# Patient Record
Sex: FEMALE | Race: WHITE | Employment: UNEMPLOYED | URBAN - METROPOLITAN AREA
[De-identification: names, ages, dates, MRNs, and addresses within clinical notes are randomized per-mention and may not be internally consistent; named-entity substitution may affect disease eponyms.]

---

## 2017-04-17 ENCOUNTER — OFFICE VISIT (OUTPATIENT)
Dept: OPHTHALMOLOGY | Facility: CLINIC | Age: 37
End: 2017-04-17

## 2017-04-17 DIAGNOSIS — H57.89 THYROID EYE DISEASE: Primary | ICD-10-CM

## 2017-04-17 DIAGNOSIS — E07.9 THYROID EYE DISEASE: Primary | ICD-10-CM

## 2017-04-17 DIAGNOSIS — H04.123 DRY EYE SYNDROME, BILATERAL: ICD-10-CM

## 2017-04-17 DIAGNOSIS — H02.203 LAGOPHTHALMOS, RIGHT: ICD-10-CM

## 2017-04-17 RX ORDER — CYCLOSPORINE 0.5 MG/ML
1 EMULSION OPHTHALMIC 2 TIMES DAILY
Qty: 1 BOX | Refills: 11 | Status: SHIPPED | OUTPATIENT
Start: 2017-04-17

## 2017-04-17 ASSESSMENT — VISUAL ACUITY
OS_SC: 20/20
OD_SC: 20/20
METHOD: SNELLEN - LINEAR

## 2017-04-17 ASSESSMENT — LAGOPHTHALMOS
OD_LAGOPHTHALMOS: 1
OS_LAGOPHTHALMOS: 2

## 2017-04-17 ASSESSMENT — TONOMETRY
OD_IOP_MMHG: 16
IOP_METHOD: ICARE
OS_IOP_MMHG: 18

## 2017-04-17 ASSESSMENT — CONF VISUAL FIELD
METHOD: COUNTING FINGERS
OS_NORMAL: 1
OD_NORMAL: 1

## 2017-04-17 ASSESSMENT — MARGIN REFLEX DISTANCE
OD_MRD1: 5
OS_MRD1: 5

## 2017-04-17 NOTE — NURSING NOTE
Chief Complaints and History of Present Illnesses   Patient presents with     Follow Up For     thyroid eye disease     HPI    Affected eye(s):  Both   Symptoms:     No blurred vision   Tearing      Frequency:  Constant       Do you have eye pain now?:  No      Comments:  Pt states still notices some tearing but it may be related to allergies. Allergy testing soon. Night vision has seemed to decrease, Pt states eyes are opening at night again. So eyes are really dry.    Deborah ROSARIO 10:15 AM April 17, 2017

## 2017-04-17 NOTE — MR AVS SNAPSHOT
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      Who to contact     Please call your clinic at [phone] to:    Ask questions about your health    Make or cancel appointments    Discuss your medicines    Learn about your test results    Speak to your doctor   If you have compliments or concerns about an experience at your clinic, or if you wish to file a complaint, please contact [facility] Patient Relations at [phone] or email us at [email or web address]         Additional Information About Your Visit        MyChart Information     Modbook gives you secure access to your electronic health record. If you see a primary care provider, you can also send messages to your care team and make appointments. If you have questions, please call your primary care clinic.  If you do not have a primary care provider, please call [phone] and they will assist you.      Modbook is an electronic gateway that provides easy, online access to your medical records. With [employer], you can request a clinic appointment, read your test results, renew a prescription or communicate with your care team.     To access your existing account, please contact your [facility] or call [phone] for assistance.        Care EveryWhere ID     This is your Care EveryWhere ID. This could be used by other organizations to access your [place]  medical records  [id number]         Blood Pressure from Last 3 Encounters:   02/15/16 113/66   02/10/16 95/83   04/03/15 128/68    Weight from Last 3 Encounters:   02/10/16 79.2 kg (174 lb 9.7 oz)   04/03/15 68 kg (150 lb)   03/06/15 70.7 kg (155 lb 13.8 oz)              Today, you had the following     No orders found for display         Today's Medication Changes          These changes are accurate as of: 4/17/17 10:51 AM.  If you have any questions, ask your nurse or doctor.               Start taking these medicines.        Dose/Directions    cycloSPORINE 0.05 % ophthalmic emulsion   Commonly known as:  RESTASIS   Used for:  Dry eye syndrome, bilateral, Lagophthalmos, right   Started by:  Momo Wallace MD        Dose:  1 drop   Place 1 drop into both eyes 2 times daily   Quantity:  1 Box   Refills:  11            Where to get your medicines      These medications were sent to [facility] [address] [place] [address] [address]  [address] [facility] [address]     Phone:  [phone]     cycloSPORINE 0.05 % ophthalmic emulsion                Primary Care Provider Office Phone # Fax #    Travis Gooden [phone]       [place] [facility] [address] [facility] [address]        Thank you!     Thank you for choosing [facility] OPHTHALMOLOGY  for your care. Our goal is always to provide you with excellent care. Hearing back from our patients is one way we can continue to improve our services. Please take a few minutes to complete the written survey that you may receive in the mail after your visit with us. Thank you!             Your Updated Medication List - Protect others around you: Learn how to safely use, store and throw away your medicines at www.disposemymeds.org.          This list is accurate as of: 4/17/17 10:51 AM.  Always use your most recent med list.                   Brand Name Dispense Instructions for use    * REFRESH P.M. Oint      Apply to eye At Bedtime Both  eyes.       * Artificial Tear Ointment Oint     2 Tube    Apply 1 Application to eye 7 times daily       BIOTIN PO      Take by mouth daily as needed       carboxymethylcellulose 0.5 % Soln ophthalmic solution    REFRESH PLUS     1 drop as needed       cycloSPORINE 0.05 % ophthalmic emulsion    RESTASIS    1 Box    Place 1 drop into both eyes 2 times daily       * erythromycin ophthalmic ointment    ROMYCIN    3.5 g    Apply small amount to sutures three times daily, apply to inner lower lid of operative eye(s) at bedtime until further directed       * erythromycin ophthalmic ointment    ROMYCIN    1 Tube    Place 0.5 inches Into the left eye 4 times daily       HYDROcodone-acetaminophen 5-325 MG per tablet    NORCO    20 tablet    Take 1 tablet by mouth every 6 hours as needed for pain Maximum of 4000 mg of acetaminophen in 24 hours.       IBUPROFEN PO      Take 200 mg by mouth every 6 hours as needed for moderate pain       LORAZEPAM PO      Take 0.5 mg by mouth every 8 hours as needed       ondansetron 4 MG ODT tab    ZOFRAN-ODT     Take by mouth every 8 hours as needed       prednisoLONE acetate 1 % ophthalmic susp    PRED FORTE    1 Bottle    Place 1 drop into both eyes 2 times daily       PRILOSEC PO      Take 20 mg by mouth every morning       TIROSINT PO      Take 125 mcg by mouth daily       UNABLE TO FIND      Inject into the muscle every 3 months MEDICATION NAME: Depo Prevara every 3 months IM hip       * Notice:  This list has 4 medication(s) that are the same as other medications prescribed for you. Read the directions carefully, and ask your doctor or other care provider to review them with you.

## 2017-04-17 NOTE — PROGRESS NOTES
Chief Complaints and History of Present Illnesses   Patient presents with     Follow Up For     thyroid eye disease           She presents for follow up today.  She has hx of thyroid eye disease.  She had some plugs placed at the last visit but did not really notice a difference.  Still having tearing intermittently and her eyes open with sleeping.  She notices that they are still dry even though she is using ointment in her eyes.      Hx of decompression Medial with mary and lateral with Rodrigo: 1/2015  Bilateral lower eyelid retraction repair    Still smoking but cutting back      Assessment & Plan     Jefe Herrera is a 36 year old female with the following diagnoses:   1. Thyroid eye disease - Both Eyes    2. 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 Dry eye syndrome, bilateral - Both Eyes       Mostly dry eye complaints.  Did not really notice an improvement with plugs and they have since fallen out.  Currently using tears and ointment at night.      Plan:  - Start Restasis BID  - continue tears during the day  - continue ointment at night  - Glad press n seal  - If not improved than consider scleral lens evaluation    Return to clinic  2 mon         Attending Physician Attestation:  I have seen and examined this patient.  I have confirmed and edited as necessary the chief complaint(s), history of present illness, review of systems, relevant history, and examination findings as documented by others.  I have personally reviewed the relevant tests, images, and reports as documented above.  I have confirmed and edited as necessary the assessment and plan and agree with this note.    - Momo Wallace MD 10:25 AM 4/17/2017

## 2017-06-12 ENCOUNTER — OFFICE VISIT (OUTPATIENT)
Dept: OPHTHALMOLOGY | Facility: CLINIC | Age: 37
End: 2017-06-12

## 2017-06-12 DIAGNOSIS — H57.89 THYROID EYE DISEASE: ICD-10-CM

## 2017-06-12 DIAGNOSIS — E07.9 THYROID EYE DISEASE: ICD-10-CM

## 2017-06-12 DIAGNOSIS — H02.229 MECHANICAL LAGOPHTHALMOS, UNSPECIFIED LATERALITY: Primary | ICD-10-CM

## 2017-06-12 DIAGNOSIS — H02.539 EYELID RETRACTION OR LAG: ICD-10-CM

## 2017-06-12 ASSESSMENT — LAGOPHTHALMOS
OS_LAGOPHTHALMOS: 2
OD_LAGOPHTHALMOS: 1

## 2017-06-12 ASSESSMENT — CONF VISUAL FIELD
METHOD: COUNTING FINGERS
OD_NORMAL: 1
OS_NORMAL: 1

## 2017-06-12 ASSESSMENT — MARGIN REFLEX DISTANCE
OS_MRD1: 5
OD_MRD1: 5

## 2017-06-12 ASSESSMENT — TONOMETRY
OS_IOP_MMHG: 18
OD_IOP_MMHG: 16
IOP_METHOD: ICARE

## 2017-06-12 ASSESSMENT — VISUAL ACUITY
METHOD: SNELLEN - LINEAR
OS_SC+: -1
OS_SC: 20/20
OD_SC: 20/20

## 2017-06-12 NOTE — PROGRESS NOTES
Chief Complaints and History of Present Illnesses   Patient presents with     Follow Up For     Thyroid eye disease - Both Eyes   Still with pain in the am.   Eyes don't close at night.            Assessment & Plan     Jefe Herrera is a 36 year old female with the following diagnoses:   1. Mechanical lagophthalmos, unspecified laterality    2. Thyroid eye disease - Both Eyes    3. Eyelid retraction or lag         PLAN:  Bilateral lateral canthoplasty (canthopexy +tarso)         Attending Physician Attestation:  I have seen and examined this patient.  I have confirmed and edited as necessary the chief complaint(s), history of present illness, review of systems, relevant history, and examination findings as documented by others.  I have personally reviewed the relevant tests, images, and reports as documented above.  I have confirmed and edited as necessary the assessment and plan and agree with this note.    - Momo Wallace MD 9:57 AM 6/12/2017    Today with Jefe Herrera, I reviewed the indications, risks, benefits, and alternatives of the proposed surgical procedure including, but not limited to, failure obtain the desired result  and need for additional surgery, bleeding, infection, loss of vision, loss of the eye, and the remote possibility of permanent damage to any organ system or death with the use of anesthesia.  I provided multiple opportunities for the questions, answered all questions to the best of my ability, and confirmed that my answers and my discussion were understood.   - Momo Wallace MD 9:58 AM 6/12/2017

## 2017-06-12 NOTE — NURSING NOTE
Chief Complaints and History of Present Illnesses   Patient presents with     Follow Up For     Thyroid eye disease - Both Eyes     HPI    Affected eye(s):  Both   Symptoms:     No blurred vision      Frequency:  Constant       Do you have eye pain now?:  No      Comments:  2 month rtn for Thyroid eye disease - Both Eyes.  Pt states improvement with restasis. Stopped for a little bit because of stye. Pt states tried press and seal and eyes still opened up at night- would like a solution. Does not want to go back to using regular tape.    - Start Restasis BID  - continue tears during the day  - continue ointment at night  - Glad press n seal    Deborah Clay COT 9:35 AM June 12, 2017

## 2017-06-12 NOTE — MR AVS SNAPSHOT
After Visit Summary   6/12/2017    Jefe Herrera    MRN: [id number]           Patient Information     Date Of Birth          1980        Visit Information        Provider Department      6/12/2017 9:00 AM Momo Wallace MD [facility] Ophthalmology        Today's Diagnoses     Mechanical lagophthalmos, unspecified laterality    -  1    Thyroid eye disease - Both Eyes        Eyelid retraction or lag           Follow-ups after your visit        Your next 10 appointments already scheduled     Aug 21, 2017 10:30 AM CDT   (Arrive by 10:15 AM)   Post-Op with Momo Wallace MD   [facility]hthal[facility] ([facility] [place])    [address] [place] [id number] [phone]              Who to contact     Please call your clinic at [phone] to:    Ask questions about your health    Make or cancel appointments    Discuss your medicines    Learn about your test results    Speak to your doctor   If you have compliments or concerns about an experience at your clinic, or if you wish to file a complaint, please contact [facility] Patient Relations at [phone] or email us at [email or web address]         Additional Information About Your Visit        MyChart Information     [employer] gives you secure access to your electronic health record. If you see a primary care provider, you can also send messages to your care team and make appointments. If you have questions, please call your primary care clinic.  If you do not have a primary care provider, please call [phone] and they will assist you.      [employer] is an electronic gateway that provides easy, online access to your medical records. With [employer], you can request a clinic appointment, read your test results, renew a prescription or communicate with your care team.     To access your existing account, please contact your [facility] or  call [phone] for assistance.        Care EveryWhere ID     This is your Care EveryWhere ID. This could be used by other organizations to access your [place] medical records  [id number]         Blood Pressure from Last 3 Encounters:   02/15/16 113/66   02/10/16 95/83   04/03/15 128/68    Weight from Last 3 Encounters:   02/10/16 79.2 kg (174 lb 9.7 oz)   04/03/15 68 kg (150 lb)   03/06/15 70.7 kg (155 lb 13.8 oz)              We Performed the Following     Alee-Operative Worksheet (Plastics)        Primary Care Provider Office Phone # Fax #    Travis Gooden [phone]       [place] [facility] [address] [facility] [address]        Thank you!     Thank you for choosing [facility] OPHTHALMOLOGY  for your care. Our goal is always to provide you with excellent care. Hearing back from our patients is one way we can continue to improve our services. Please take a few minutes to complete the written survey that you may receive in the mail after your visit with us. Thank you!             Your Updated Medication List - Protect others around you: Learn how to safely use, store and throw away your medicines at www.disposemymeds.org.          This list is accurate as of: 6/12/17 10:14 AM.  Always use your most recent med list.                   Brand Name Dispense Instructions for use    * REFRESH P.M. Oint      Apply to eye At Bedtime Both eyes.       * Artificial Tear Ointment Oint     2 Tube    Apply 1 Application to eye 7 times daily       BIOTIN PO      Take by mouth daily as needed       carboxymethylcellulose 0.5 % Soln ophthalmic solution    REFRESH PLUS     1 drop as needed       cycloSPORINE 0.05 % ophthalmic emulsion    RESTASIS    1 Box    Place 1 drop into both eyes 2 times daily       * erythromycin ophthalmic ointment    ROMYCIN    3.5 g    Apply small amount to sutures three times daily, apply to inner lower lid of operative eye(s) at bedtime until further directed       * erythromycin  ophthalmic ointment    ROMYCIN    1 Tube    Place 0.5 inches Into the left eye 4 times daily       HYDROcodone-acetaminophen 5-325 MG per tablet    NORCO    20 tablet    Take 1 tablet by mouth every 6 hours as needed for pain Maximum of 4000 mg of acetaminophen in 24 hours.       IBUPROFEN PO      Take 200 mg by mouth every 6 hours as needed for moderate pain       LORAZEPAM PO      Take 0.5 mg by mouth every 8 hours as needed       ondansetron 4 MG ODT tab    ZOFRAN-ODT     Take by mouth every 8 hours as needed       prednisoLONE acetate 1 % ophthalmic susp    PRED FORTE    1 Bottle    Place 1 drop into both eyes 2 times daily       PRILOSEC PO      Take 20 mg by mouth every morning       TIROSINT PO      Take 125 mcg by mouth daily       UNABLE TO FIND      Inject into the muscle every 3 months MEDICATION NAME: Depo Prevara every 3 months IM hip       * Notice:  This list has 4 medication(s) that are the same as other medications prescribed for you. Read the directions carefully, and ask your doctor or other care provider to review them with you.

## 2017-08-07 ENCOUNTER — TELEPHONE (OUTPATIENT)
Dept: OPHTHALMOLOGY | Facility: CLINIC | Age: 37
End: 2017-08-07

## 2017-08-07 NOTE — TELEPHONE ENCOUNTER
I called patient to let her know that a nurse will call her one to two days prior to surgery to let her know what time her surgery will be at and also eating and drinking instructions.    Rex  Surgery Scheduler  [phone]

## 2017-08-08 ENCOUNTER — ANESTHESIA EVENT (OUTPATIENT)
Dept: SURGERY | Facility: AMBULATORY SURGERY CENTER | Age: 37
End: 2017-08-08

## 2017-08-09 ENCOUNTER — SURGERY (OUTPATIENT)
Age: 37
End: 2017-08-09

## 2017-08-09 ENCOUNTER — ANESTHESIA (OUTPATIENT)
Dept: SURGERY | Facility: AMBULATORY SURGERY CENTER | Age: 37
End: 2017-08-09

## 2017-08-09 ENCOUNTER — HOSPITAL ENCOUNTER (OUTPATIENT)
Facility: AMBULATORY SURGERY CENTER | Age: 37
End: 2017-08-09
Attending: OPHTHALMOLOGY

## 2017-08-09 VITALS
OXYGEN SATURATION: 97 % | WEIGHT: 174 LBS | RESPIRATION RATE: 16 BRPM | TEMPERATURE: 97.5 F | HEIGHT: 66 IN | SYSTOLIC BLOOD PRESSURE: 126 MMHG | BODY MASS INDEX: 27.97 KG/M2 | DIASTOLIC BLOOD PRESSURE: 76 MMHG

## 2017-08-09 DIAGNOSIS — Z98.890 POSTOPERATIVE EYE STATE: Primary | ICD-10-CM

## 2017-08-09 LAB
HCG UR QL: NEGATIVE
INTERNAL QC OK POCT: YES

## 2017-08-09 RX ORDER — KETOROLAC TROMETHAMINE 30 MG/ML
30 INJECTION, SOLUTION INTRAMUSCULAR; INTRAVENOUS EVERY 6 HOURS PRN
Status: DISCONTINUED | OUTPATIENT
Start: 2017-08-09 | End: 2017-08-10 | Stop reason: HOSPADM

## 2017-08-09 RX ORDER — ERYTHROMYCIN 5 MG/G
OINTMENT OPHTHALMIC PRN
Status: DISCONTINUED | OUTPATIENT
Start: 2017-08-09 | End: 2017-08-09 | Stop reason: HOSPADM

## 2017-08-09 RX ORDER — FENTANYL CITRATE 50 UG/ML
25-50 INJECTION, SOLUTION INTRAMUSCULAR; INTRAVENOUS
Status: DISCONTINUED | OUTPATIENT
Start: 2017-08-09 | End: 2017-08-09 | Stop reason: HOSPADM

## 2017-08-09 RX ORDER — MEPERIDINE HYDROCHLORIDE 25 MG/ML
12.5 INJECTION INTRAMUSCULAR; INTRAVENOUS; SUBCUTANEOUS
Status: DISCONTINUED | OUTPATIENT
Start: 2017-08-09 | End: 2017-08-10 | Stop reason: HOSPADM

## 2017-08-09 RX ORDER — ONDANSETRON 2 MG/ML
INJECTION INTRAMUSCULAR; INTRAVENOUS PRN
Status: DISCONTINUED | OUTPATIENT
Start: 2017-08-09 | End: 2017-08-09

## 2017-08-09 RX ORDER — ONDANSETRON 2 MG/ML
4 INJECTION INTRAMUSCULAR; INTRAVENOUS EVERY 30 MIN PRN
Status: DISCONTINUED | OUTPATIENT
Start: 2017-08-09 | End: 2017-08-10 | Stop reason: HOSPADM

## 2017-08-09 RX ORDER — NALOXONE HYDROCHLORIDE 0.4 MG/ML
.1-.4 INJECTION, SOLUTION INTRAMUSCULAR; INTRAVENOUS; SUBCUTANEOUS
Status: DISCONTINUED | OUTPATIENT
Start: 2017-08-09 | End: 2017-08-10 | Stop reason: HOSPADM

## 2017-08-09 RX ORDER — ONDANSETRON 4 MG/1
4 TABLET, ORALLY DISINTEGRATING ORAL EVERY 30 MIN PRN
Status: DISCONTINUED | OUTPATIENT
Start: 2017-08-09 | End: 2017-08-10 | Stop reason: HOSPADM

## 2017-08-09 RX ORDER — HYDROCODONE BITARTRATE AND IBUPROFEN 7.5; 2 MG/1; MG/1
1 TABLET, FILM COATED ORAL ONCE
Status: COMPLETED | OUTPATIENT
Start: 2017-08-09 | End: 2017-08-09

## 2017-08-09 RX ORDER — NEOMYCIN SULFATE, POLYMYXIN B SULFATE AND DEXAMETHASONE 3.5; 10000; 1 MG/ML; [USP'U]/ML; MG/ML
1 SUSPENSION/ DROPS OPHTHALMIC 4 TIMES DAILY
Qty: 1 BOTTLE | Refills: 0 | Status: SHIPPED | OUTPATIENT
Start: 2017-08-09

## 2017-08-09 RX ORDER — LIDOCAINE 40 MG/G
CREAM TOPICAL
Status: DISCONTINUED | OUTPATIENT
Start: 2017-08-09 | End: 2017-08-09 | Stop reason: HOSPADM

## 2017-08-09 RX ORDER — ACETAMINOPHEN 325 MG/1
975 TABLET ORAL ONCE
Status: COMPLETED | OUTPATIENT
Start: 2017-08-09 | End: 2017-08-09

## 2017-08-09 RX ORDER — SODIUM CHLORIDE, SODIUM LACTATE, POTASSIUM CHLORIDE, CALCIUM CHLORIDE 600; 310; 30; 20 MG/100ML; MG/100ML; MG/100ML; MG/100ML
INJECTION, SOLUTION INTRAVENOUS CONTINUOUS
Status: DISCONTINUED | OUTPATIENT
Start: 2017-08-09 | End: 2017-08-10 | Stop reason: HOSPADM

## 2017-08-09 RX ORDER — TETRACAINE HYDROCHLORIDE 5 MG/ML
SOLUTION OPHTHALMIC PRN
Status: DISCONTINUED | OUTPATIENT
Start: 2017-08-09 | End: 2017-08-09 | Stop reason: HOSPADM

## 2017-08-09 RX ORDER — ERYTHROMYCIN 5 MG/G
OINTMENT OPHTHALMIC
Qty: 3.5 G | Refills: 0 | Status: SHIPPED | OUTPATIENT
Start: 2017-08-09

## 2017-08-09 RX ORDER — HYDROCODONE BITARTRATE AND ACETAMINOPHEN 5; 325 MG/1; MG/1
1-2 TABLET ORAL EVERY 4 HOURS PRN
Qty: 10 TABLET | Refills: 0 | Status: SHIPPED | OUTPATIENT
Start: 2017-08-09 | End: 2017-08-15

## 2017-08-09 RX ORDER — PROPOFOL 10 MG/ML
INJECTION, EMULSION INTRAVENOUS PRN
Status: DISCONTINUED | OUTPATIENT
Start: 2017-08-09 | End: 2017-08-09

## 2017-08-09 RX ORDER — LIDOCAINE HYDROCHLORIDE 20 MG/ML
INJECTION, SOLUTION INFILTRATION; PERINEURAL PRN
Status: DISCONTINUED | OUTPATIENT
Start: 2017-08-09 | End: 2017-08-09

## 2017-08-09 RX ORDER — ONDANSETRON 2 MG/ML
4 INJECTION INTRAMUSCULAR; INTRAVENOUS ONCE
Status: COMPLETED | OUTPATIENT
Start: 2017-08-09 | End: 2017-08-09

## 2017-08-09 RX ORDER — SODIUM CHLORIDE, SODIUM LACTATE, POTASSIUM CHLORIDE, CALCIUM CHLORIDE 600; 310; 30; 20 MG/100ML; MG/100ML; MG/100ML; MG/100ML
INJECTION, SOLUTION INTRAVENOUS CONTINUOUS
Status: DISCONTINUED | OUTPATIENT
Start: 2017-08-09 | End: 2017-08-09 | Stop reason: HOSPADM

## 2017-08-09 RX ADMIN — PROPOFOL 20 MG: 10 INJECTION, EMULSION INTRAVENOUS at 12:04

## 2017-08-09 RX ADMIN — KETOROLAC TROMETHAMINE 30 MG: 30 INJECTION, SOLUTION INTRAMUSCULAR; INTRAVENOUS at 12:42

## 2017-08-09 RX ADMIN — ONDANSETRON 4 MG: 2 INJECTION INTRAMUSCULAR; INTRAVENOUS at 11:48

## 2017-08-09 RX ADMIN — PROPOFOL 20 MG: 10 INJECTION, EMULSION INTRAVENOUS at 12:06

## 2017-08-09 RX ADMIN — ONDANSETRON 4 MG: 2 INJECTION INTRAMUSCULAR; INTRAVENOUS at 10:34

## 2017-08-09 RX ADMIN — PROPOFOL 30 MG: 10 INJECTION, EMULSION INTRAVENOUS at 11:52

## 2017-08-09 RX ADMIN — PROPOFOL 30 MG: 10 INJECTION, EMULSION INTRAVENOUS at 11:49

## 2017-08-09 RX ADMIN — ERYTHROMYCIN 1 INCH: 5 OINTMENT OPHTHALMIC at 12:11

## 2017-08-09 RX ADMIN — PROPOFOL 20 MG: 10 INJECTION, EMULSION INTRAVENOUS at 11:54

## 2017-08-09 RX ADMIN — PROPOFOL 30 MG: 10 INJECTION, EMULSION INTRAVENOUS at 12:08

## 2017-08-09 RX ADMIN — HYDROCODONE BITARTRATE AND IBUPROFEN 1 TABLET: 7.5; 2 TABLET, FILM COATED ORAL at 13:03

## 2017-08-09 RX ADMIN — SODIUM CHLORIDE, SODIUM LACTATE, POTASSIUM CHLORIDE, CALCIUM CHLORIDE: 600; 310; 30; 20 INJECTION, SOLUTION INTRAVENOUS at 11:44

## 2017-08-09 RX ADMIN — ACETAMINOPHEN 975 MG: 325 TABLET ORAL at 10:21

## 2017-08-09 RX ADMIN — TETRACAINE HYDROCHLORIDE 2 DROP: 5 SOLUTION OPHTHALMIC at 12:07

## 2017-08-09 RX ADMIN — PROPOFOL 30 MG: 10 INJECTION, EMULSION INTRAVENOUS at 11:58

## 2017-08-09 RX ADMIN — LIDOCAINE HYDROCHLORIDE 60 MG: 20 INJECTION, SOLUTION INFILTRATION; PERINEURAL at 11:49

## 2017-08-09 RX ADMIN — PROPOFOL 20 MG: 10 INJECTION, EMULSION INTRAVENOUS at 12:11

## 2017-08-09 ASSESSMENT — COPD QUESTIONNAIRES
CAT_SEVERITY: MILD
COPD: 1

## 2017-08-09 NOTE — ANESTHESIA CARE TRANSFER NOTE
Patient: Jefe Herrera    Procedure(s):  Bilateral Lateral Canthoplasty - Wound Class: I-Clean    Diagnosis: Lagophthalmos  Diagnosis Additional Information: No value filed.    Anesthesia Type:   MAC     Note:  Airway :Room Air  Patient transferred to:Phase II  Comments: Arrive Phase II, Stable, Airway Intact  110/72, 68,20,97%  All questions answered.        Vitals: (Last set prior to Anesthesia Care Transfer)    CRNA VITALS  8/9/2017 1200 - 8/9/2017 1230      8/9/2017             Resp Rate (set): 10                Electronically Signed By: CHARLENE Hannah CRNA  August 9, 2017  12:30 PM

## 2017-08-09 NOTE — IP AVS SNAPSHOT
MRN:[id number]                      After Visit Summary   8/9/2017    Jefe Herrera    MRN: [id number]           Thank you!     Thank you for choosing [facility] for your care. Our goal is always to provide you with excellent care. Hearing back from our patients is one way we can continue to improve our services. Please take a few minutes to complete the written survey that you may receive in the mail after you visit with us. Thank you!        Patient Information     Date Of Birth          1980        About your hospital stay     You were admitted on:  August 9, 2017 You last received care in the:  [facility]    You were discharged on:  August 9, 2017       Who to Call     For medical emergencies, please call 911.  For non-urgent questions about your medical care, please call your primary care provider or clinic, [phone]  For questions related to your surgery, please call your surgery clinic        Attending Provider     Provider Specialty    Momo Wallace MD Ophthalmology       Primary Care Provider Office Phone # Fax #    Travis Gooden [phone]      After Care Instructions     Discharge Medication Instructions       Do NOT take aspirin or medications containing NSAIDS for 72 hours after procedure.            Ice to affected area       Apply cold pack for 15 minutes on, 15 minutes off, for 48 hours while awake.                  Your next 10 appointments already scheduled     Aug 21, 2017 10:30 AM CDT   (Arrive by 10:15 AM)   Post-Op with Momo Wallace MD   [facility] ([facility])    [address] [place] [id number] [phone]              Further instructions from your care team       Post-operative Instructions    Ophthalmic Plastic and Reconstructive Surgery  Momo Wallace M.D.  LUNA Ly M.D.    All instructions apply to the operated  eye(s) or eyelid(s)      What to expect after surgery:    Thre will be some swelling, bruising, and likely a black eye (even into the lower eyelids and cheeks). Also expect crusting and discharge from the eye and/or incisions.     A small amount of surface bleeding is normal for the first 48 hours after surgery.    You may notice some bloody tears for the first few days after surgery. This is normal.    Your eye(s) and eyelid(s) may be painful and tender. This is normal after surgery. Use the pain medication as prescribed. If your pain does not improve despite the medication, contact the office.    Wound care and personal care:    If a patch or bandage has been placed, please leave this in place until seen in clinic. Prevent the bandage from getting wet.     Apply ice compresses 15 minutes on 15 minutes off while awake for the first 2 days after surgery, then switch to warm compresses 4 times a day until seen by your physician.     For warm packs you can place a cup of dry uncooked rice in a clean cotton sock. Place sock in microwave 30 seconds to one minute. Next place the warm sock into a plastic bag and wrap the bag with clean warm wet washcloth and place over operated eye.      You may shower or wash your hair the day after surgery. Do not bathe or go swimming for 1 week to prevent contamination of your wounds.    Do not apply make-up to the eyes or eyelids for 2 weeks after surgery.      Activity restrictions and driving:    Avoid heavy lifting, bending, exercise or strenuous activity for 1 week after surgery.    You may resume other activities and return to work as tolerated.    You may not resume driving until have you stopped using narcotic pain medications(such as Norco, Percocet, Tylenol #3).    Medications:    Restart all your regular home medications and eye drops today. If you take Plavix or Aspirin on a regular basis, wait for 3 days after your surgery before restarting these in order to decrease the  risk of bleeding complications.    Avoid aspirin and aspirin-like medications (Motrin, Aleve, Ibuprofen, Odalis-[place] etc) for 5 days to reduce the risk of bleeding. You may take Tylenol (acetaminophen) for pain.    In addition to your home medications, take the following post-operative medications as prescribed by your physician:    Apply antibiotic ointment (erythromycin) to all sutures three times a day, and into the operated eye(s) at night.     Instill eye drops (Maxitrol) four times a day until the bottle finished.     Take 1 to 2 pain pills (norco or tylenol 3 as prescribed) as needed for pain up to every 4 hours.    The pain pills may make you drowsy. You must not drive a car, operate heavy machinery or drink alcohol while taking them.    The pain pills may cause constipation and nausea. Take them with some food to prevent a stomach upset. If you continue to experience nausea, call your physician.      WARNING: All the prescription pain medications listed above contain Tylenol (acetaminophen). You must not take more than 4,000 mg of acetaminophen per 24-hour period. This is equivalent to 6 tablets of Darvocet, 8 tablets of Vicodin, or 12 tablets of Norco, Percocet or Tylenol #3. If you take other over-the-counter medications containing acetaminophen, you must take the amount of acetaminophen into account and reduce the number of prescribed pain pills accordingly.    Contact information and follow-up:    Return to the Eye Clinic for a follow-up appointment with your physician as  scheduled. If no appointment has been scheduled, call [phone] for an  appointment with Dr. Wallace within 1 to 2 weeks from your date of surgery.      For severe pain, bleeding, or loss of vision, call the Eye Clinic at [phone].    After hours or on weekends and holidays, call [phone] and ask to speak with the ophthalmologist on call.    [facility]  Home Care Following  Anesthesia  For 24 hours after surgery:  1. Get plenty of rest.  A responsible adult must stay with you for at least 24 hours after you leave the surgery center.  2. Do not drive or use heavy equipment.  If you have weakness or tingling, don't drive or use heavy equipment until this feeling goes away.   3. Do not drink alcohol.   4. Avoid strenuous or risky activities.  Ask for help when climbing stairs.  5. You may feel lightheaded.  IF so, sit for a few minutes before standing.  Have someone help you get up.   6. If you have nausea (feel sick to your stomach): Drink only clear liquids such as apple juice, ginger ale, broth or 7-Up.  Rest may also help.  Be sure to drink enough fluids.  Move to a regular diet as you feel able.   7. You may have a slight fever.  Call the doctor if your fever is over 100 F (37.7 C) (taken under the tongue) or lasts longer than 24 hours.  8. You may have a dry mouth, a sore throat, muscle aches or trouble sleeping. These should go away after 24 hours.  9. Do not make important or legal decisions.               Tips for taking pain medications  To get the best pain relief possible, remember these points:    Take pain medications as directed, before pain becomes severe.    Pain medication can upset your stomach: taking it with food may help.    Constipation is a common side effect of pain medication. Drink plenty of  fluids.    Eat foods high in fiber. Take a stool softener if recommended by your doctor or pharmacist.    Do not drink alcohol, drive or operate machinery while taking pain medications.    Ask about other ways to control pain, such as with heat, ice or relaxation.    Tylenol/Acetaminophen Consumption  To help encourage the safe use of acetaminophen, the makers of TYLENOL  have lowered the maximum daily dose for single-ingredient Extra Strength TYLENOL  (acetaminophen) products sold in the [place] from 8 pills per day (4,000 mg) to 6 pills per day (3,000 mg). The dosing  "interval has also changed from 2 pills every 4-6 hours to 2 pills every 6 hours.    If you feel your pain relief is insufficient, you may take Tylenol/Acetaminophen in addition to your narcotic pain medication.     Be careful not to exceed 3,000 mg of Tylenol/Acetaminophen in a 24 hour period from all sources.    If you are taking extra strength Tylenol/acetaminophen (500 mg), the maximum dose is 6 tablets in 24 hours.    If you are taking regular strength acetaminophen (325 mg), the maximum dose is 9 tablets in 24 hours.    Call a doctor for any of the followin. Signs of infection (fever, growing tenderness at the surgery site, a large amount of drainage or bleeding, severe pain, foul-smelling drainage, redness, swelling).  2. It has been over 8 to 10 hours since surgery and you are still not able to urinate (pass water).  3. Headache for over 24 hours.  4. Numbness, tingling or weakness the day after surgery (if you had spinal anesthesia).  Your doctor is:  Dr. Momo Wallace, Ophthalmology: [phone]                    Or dial [phone] and ask for the resident on call for:  Ophthalmology  For emergency care, call the:  [facility]:  [phone] (TTY for hearing impaired: [phone])                  Pending Results     No orders found from 2017 to 8/10/2017.            Admission Information     Date & Time Provider Department Dept. Phone    2017 Momo Wallace MD [facility] [phone]      Your Vitals Were     Blood Pressure Temperature Respirations Height Weight Pulse Oximetry    110/72 97.3  F (36.3  C) (Temporal) 16 1.676 m (5' 6\") 78.9 kg (174 lb) 95%    BMI (Body Mass Index)                   28.08 kg/m2           [employer] Information     [employer] gives you secure access to your electronic health record. If you see a primary care provider, you can also send messages to your care team and make appointments. If you have questions, please " call your primary care clinic.  If you do not have a primary care provider, please call [phone] and they will assist you.      [employer] is an electronic gateway that provides easy, online access to your medical records. With [employer], you can request a clinic appointment, read your test results, renew a prescription or communicate with your care team.     To access your existing account, please contact your [facility] or call [phone] for assistance.        Care EveryWhere ID     This is your Care EveryWhere ID. This could be used by other organizations to access your [facility] medical records  [id number]        Equal Access to Services     JERED RANGEL : Marshal Tan, nereida mason, chris mullins, mc mccrary . So [facility] [phone].    ATENCIÓN: Si habla español, tiene a doe disposición servicios gratuitos de asistencia lingüística. Llame al [phone].    We comply with applicable federal civil rights laws and [place] laws. We do not discriminate on the basis of race, color, national origin, age, disability sex, sexual orientation or gender identity.               Review of your medicines      START taking        Dose / Directions    neomycin-polymyxin-dexamethasone 3.5-83646-3.1 Susp ophthalmic susp   Commonly known as:  MAXITROL   Used for:  Postoperative eye state        Dose:  1 drop   Place 1 drop into both eyes 4 times daily   Quantity:  1 Bottle   Refills:  0         CONTINUE these medicines which may have CHANGED, or have new prescriptions. If we are uncertain of the size of tablets/capsules you have at home, strength may be listed as something that might have changed.        Dose / Directions    * erythromycin ophthalmic ointment   Commonly known as:  ROMYCIN   This may have changed:  Another medication with the same name was added. Make sure you understand how and when to take each.   Used for:   Post-operative state        Apply small amount to sutures three times daily, apply to inner lower lid of operative eye(s) at bedtime until further directed   Quantity:  3.5 g   Refills:  10       * erythromycin ophthalmic ointment   Commonly known as:  ROMYCIN   This may have changed:  Another medication with the same name was added. Make sure you understand how and when to take each.        Dose:  0.5 inch   Place 0.5 inches Into the left eye 4 times daily   Quantity:  1 Tube   Refills:  0       * erythromycin ophthalmic ointment   Commonly known as:  ROMYCIN   This may have changed:  You were already taking a medication with the same name, and this prescription was added. Make sure you understand how and when to take each.   Used for:  Postoperative eye state        Apply to skin incisions three times daily and into the eye at bedtime.   Quantity:  3.5 g   Refills:  0       * HYDROcodone-acetaminophen 5-325 MG per tablet   Commonly known as:  NORCO   This may have changed:  Another medication with the same name was added. Make sure you understand how and when to take each.   Used for:  Post-operative state        Dose:  1 tablet   Take 1 tablet by mouth every 6 hours as needed for pain Maximum of 4000 mg of acetaminophen in 24 hours.   Quantity:  20 tablet   Refills:  0       * HYDROcodone-acetaminophen 5-325 MG per tablet   Commonly known as:  NORCO   This may have changed:  You were already taking a medication with the same name, and this prescription was added. Make sure you understand how and when to take each.   Used for:  Postoperative eye state        Dose:  1-2 tablet   Take 1-2 tablets by mouth every 4 hours as needed for moderate to severe pain   Quantity:  10 tablet   Refills:  0       * Notice:  This list has 5 medication(s) that are the same as other medications prescribed for you. Read the directions carefully, and ask your doctor or other care provider to review them with you.      CONTINUE these  medicines which have NOT CHANGED        Dose / Directions    * REFRESH P.M. Oint        Apply to eye At Bedtime Both eyes.   Refills:  0       * Artificial Tear Ointment Oint   Used for:  Thyroid eye disease        Dose:  1 Application   Apply 1 Application to eye 7 times daily   Quantity:  2 Tube   Refills:  12       BIOTIN PO        Take by mouth daily as needed   Refills:  0       carboxymethylcellulose 0.5 % Soln ophthalmic solution   Commonly known as:  REFRESH PLUS        Dose:  1 drop   1 drop as needed   Refills:  0       cycloSPORINE 0.05 % ophthalmic emulsion   Commonly known as:  RESTASIS   Used for:  Dry eye syndrome, bilateral, Lagophthalmos, right        Dose:  1 drop   Place 1 drop into both eyes 2 times daily   Quantity:  1 Box   Refills:  11       IBUPROFEN PO        Dose:  200 mg   Take 200 mg by mouth every 6 hours as needed for moderate pain   Refills:  0       LORAZEPAM PO        Dose:  0.5 mg   Take 0.5 mg by mouth every 8 hours as needed   Refills:  0       ondansetron 4 MG ODT tab   Commonly known as:  ZOFRAN-ODT        Take by mouth every 8 hours as needed   Refills:  0       prednisoLONE acetate 1 % ophthalmic susp   Commonly known as:  PRED FORTE   Used for:  Dry eye syndrome, bilateral        Dose:  1 drop   Place 1 drop into both eyes 2 times daily   Quantity:  1 Bottle   Refills:  0       PRILOSEC PO        Dose:  20 mg   Take 20 mg by mouth every morning   Refills:  0       TIROSINT PO        Dose:  125 mcg   Take 125 mcg by mouth daily   Refills:  0       UNABLE TO FIND        Inject into the muscle every 3 months MEDICATION NAME: Depo Prevara every 3 months IM hip   Refills:  0       * Notice:  This list has 2 medication(s) that are the same as other medications prescribed for you. Read the directions carefully, and ask your doctor or other care provider to review them with you.         Where to get your medicines      These medications were sent to [facility]  [facility] - [place], [place] - [address]  [address] [facility] [address], [facility] [address]    Hours:  TRANSPLANT PHONE NUMBER [phone] Phone:  [phone]     erythromycin ophthalmic ointment    neomycin-polymyxin-dexamethasone 3.5-34305-3.1 Susp ophthalmic susp         Some of these will need a paper prescription and others can be bought over the counter. Ask your nurse if you have questions.     Bring a paper prescription for each of these medications     HYDROcodone-acetaminophen 5-325 MG per tablet                Protect others around you: Learn how to safely use, store and throw away your medicines at www.disposemymeds.org.             Medication List: This is a list of all your medications and when to take them. Check marks below indicate your daily home schedule. Keep this list as a reference.      Medications           Morning Afternoon Evening Bedtime As Needed    * REFRESH P.M. Oint   Apply to eye At Bedtime Both eyes.                                * Artificial Tear Ointment Oint   Apply 1 Application to eye 7 times daily                                BIOTIN PO   Take by mouth daily as needed                                carboxymethylcellulose 0.5 % Soln ophthalmic solution   Commonly known as:  REFRESH PLUS   1 drop as needed                                cycloSPORINE 0.05 % ophthalmic emulsion   Commonly known as:  RESTASIS   Place 1 drop into both eyes 2 times daily                                * erythromycin ophthalmic ointment   Commonly known as:  ROMYCIN   Apply small amount to sutures three times daily, apply to inner lower lid of operative eye(s) at bedtime until further directed   Last time this was given:  1 inch on 8/9/2017 12:11 PM                                * erythromycin ophthalmic ointment   Commonly known as:  ROMYCIN   Place 0.5 inches Into the left eye 4 times daily   Last time this was given:  1 inch on 8/9/2017 12:11 PM                                *  erythromycin ophthalmic ointment   Commonly known as:  ROMYCIN   Apply to skin incisions three times daily and into the eye at bedtime.   Last time this was given:  1 inch on 8/9/2017 12:11 PM                                * HYDROcodone-acetaminophen 5-325 MG per tablet   Commonly known as:  NORCO   Take 1 tablet by mouth every 6 hours as needed for pain Maximum of 4000 mg of acetaminophen in 24 hours.                                * HYDROcodone-acetaminophen 5-325 MG per tablet   Commonly known as:  NORCO   Take 1-2 tablets by mouth every 4 hours as needed for moderate to severe pain                                IBUPROFEN PO   Take 200 mg by mouth every 6 hours as needed for moderate pain                                LORAZEPAM PO   Take 0.5 mg by mouth every 8 hours as needed                                neomycin-polymyxin-dexamethasone 3.5-16659-4.1 Susp ophthalmic susp   Commonly known as:  MAXITROL   Place 1 drop into both eyes 4 times daily                                ondansetron 4 MG ODT tab   Commonly known as:  ZOFRAN-ODT   Take by mouth every 8 hours as needed                                prednisoLONE acetate 1 % ophthalmic susp   Commonly known as:  PRED FORTE   Place 1 drop into both eyes 2 times daily                                PRILOSEC PO   Take 20 mg by mouth every morning                                TIROSINT PO   Take 125 mcg by mouth daily                                UNABLE TO FIND   Inject into the muscle every 3 months MEDICATION NAME: Depo Prevara every 3 months IM hip                                * Notice:  This list has 7 medication(s) that are the same as other medications prescribed for you. Read the directions carefully, and ask your doctor or other care provider to review them with you.

## 2017-08-09 NOTE — DISCHARGE INSTRUCTIONS
Post-operative Instructions    Ophthalmic Plastic and Reconstructive Surgery  Momo Wallace M.D.  Vane Moreland M.D.  Homa Osborne M.D.    All instructions apply to the operated eye(s) or eyelid(s)      What to expect after surgery:    Thre will be some swelling, bruising, and likely a black eye (even into the lower eyelids and cheeks). Also expect crusting and discharge from the eye and/or incisions.     A small amount of surface bleeding is normal for the first 48 hours after surgery.    You may notice some bloody tears for the first few days after surgery. This is normal.    Your eye(s) and eyelid(s) may be painful and tender. This is normal after surgery. Use the pain medication as prescribed. If your pain does not improve despite the medication, contact the office.    Wound care and personal care:    If a patch or bandage has been placed, please leave this in place until seen in clinic. Prevent the bandage from getting wet.     Apply ice compresses 15 minutes on 15 minutes off while awake for the first 2 days after surgery, then switch to warm compresses 4 times a day until seen by your physician.     For warm packs you can place a cup of dry uncooked rice in a clean cotton sock. Place sock in microwave 30 seconds to one minute. Next place the warm sock into a plastic bag and wrap the bag with clean warm wet washcloth and place over operated eye.      You may shower or wash your hair the day after surgery. Do not bathe or go swimming for 1 week to prevent contamination of your wounds.    Do not apply make-up to the eyes or eyelids for 2 weeks after surgery.      Activity restrictions and driving:    Avoid heavy lifting, bending, exercise or strenuous activity for 1 week after surgery.    You may resume other activities and return to work as tolerated.    You may not resume driving until have you stopped using narcotic pain medications(such as Norco, Percocet, Tylenol #3).    Medications:    Restart  all your regular home medications and eye drops today. If you take Plavix or Aspirin on a regular basis, wait for 3 days after your surgery before restarting these in order to decrease the risk of bleeding complications.    Avoid aspirin and aspirin-like medications (Motrin, Aleve, Ibuprofen, Odalis-Nelson etc) for 5 days to reduce the risk of bleeding. You may take Tylenol (acetaminophen) for pain.    In addition to your home medications, take the following post-operative medications as prescribed by your physician:    Apply antibiotic ointment (erythromycin) to all sutures three times a day, and into the operated eye(s) at night.     Instill eye drops (Maxitrol) four times a day until the bottle finished.     Take 1 to 2 pain pills (norco or tylenol 3 as prescribed) as needed for pain up to every 4 hours.    The pain pills may make you drowsy. You must not drive a car, operate heavy machinery or drink alcohol while taking them.    The pain pills may cause constipation and nausea. Take them with some food to prevent a stomach upset. If you continue to experience nausea, call your physician.      WARNING: All the prescription pain medications listed above contain Tylenol (acetaminophen). You must not take more than 4,000 mg of acetaminophen per 24-hour period. This is equivalent to 6 tablets of Darvocet, 8 tablets of Vicodin, or 12 tablets of Norco, Percocet or Tylenol #3. If you take other over-the-counter medications containing acetaminophen, you must take the amount of acetaminophen into account and reduce the number of prescribed pain pills accordingly.    Contact information and follow-up:    Return to the Eye Clinic for a follow-up appointment with your physician as  scheduled. If no appointment has been scheduled, call [phone] for an  appointment with Dr. Wallace within 1 to 2 weeks from your date of surgery.      For severe pain, bleeding, or loss of vision, call the Eye Clinic at [phone].    After  hours or on weekends and holidays, call [phone] and ask to speak with the ophthalmologist on call.    [facility]  Home Care Following Anesthesia  For 24 hours after surgery:  1. Get plenty of rest.  A responsible adult must stay with you for at least 24 hours after you leave the surgery center.  2. Do not drive or use heavy equipment.  If you have weakness or tingling, don't drive or use heavy equipment until this feeling goes away.   3. Do not drink alcohol.   4. Avoid strenuous or risky activities.  Ask for help when climbing stairs.  5. You may feel lightheaded.  IF so, sit for a few minutes before standing.  Have someone help you get up.   6. If you have nausea (feel sick to your stomach): Drink only clear liquids such as apple juice, ginger ale, broth or 7-Up.  Rest may also help.  Be sure to drink enough fluids.  Move to a regular diet as you feel able.   7. You may have a slight fever.  Call the doctor if your fever is over 100 F (37.7 C) (taken under the tongue) or lasts longer than 24 hours.  8. You may have a dry mouth, a sore throat, muscle aches or trouble sleeping. These should go away after 24 hours.  9. Do not make important or legal decisions.               Tips for taking pain medications  To get the best pain relief possible, remember these points:    Take pain medications as directed, before pain becomes severe.    Pain medication can upset your stomach: taking it with food may help.    Constipation is a common side effect of pain medication. Drink plenty of  fluids.    Eat foods high in fiber. Take a stool softener if recommended by your doctor or pharmacist.    Do not drink alcohol, drive or operate machinery while taking pain medications.    Ask about other ways to control pain, such as with heat, ice or relaxation.    Tylenol/Acetaminophen Consumption  To help encourage the safe use of acetaminophen, the makers of TYLENOL  have lowered the maximum  daily dose for single-ingredient Extra Strength TYLENOL  (acetaminophen) products sold in the [place] from 8 pills per day (4,000 mg) to 6 pills per day (3,000 mg). The dosing interval has also changed from 2 pills every 4-6 hours to 2 pills every 6 hours.    If you feel your pain relief is insufficient, you may take Tylenol/Acetaminophen in addition to your narcotic pain medication.     Be careful not to exceed 3,000 mg of Tylenol/Acetaminophen in a 24 hour period from all sources.    If you are taking extra strength Tylenol/acetaminophen (500 mg), the maximum dose is 6 tablets in 24 hours.    If you are taking regular strength acetaminophen (325 mg), the maximum dose is 9 tablets in 24 hours.    Call a doctor for any of the followin. Signs of infection (fever, growing tenderness at the surgery site, a large amount of drainage or bleeding, severe pain, foul-smelling drainage, redness, swelling).  2. It has been over 8 to 10 hours since surgery and you are still not able to urinate (pass water).  3. Headache for over 24 hours.  4. Numbness, tingling or weakness the day after surgery (if you had spinal anesthesia).  Your doctor is:  Dr. Momo Wallace, Ophthalmology: [phone]                    Or dial [phone] and ask for the resident on call for:  Ophthalmology  For emergency care, call the:  [facility]:  [phone] (TTY for hearing impaired: [phone])

## 2017-08-09 NOTE — ANESTHESIA PREPROCEDURE EVALUATION
Anesthesia Evaluation     . Pt has had prior anesthetic. Type: General    History of anesthetic complications   - PONV        ROS/MED HX    ENT/Pulmonary:     (+)mild COPD, , . .    Neurologic:  - neg neurologic ROS     Cardiovascular:  - neg cardiovascular ROS       METS/Exercise Tolerance:  >4 METS   Hematologic:  - neg hematologic  ROS       Musculoskeletal:  - neg musculoskeletal ROS       GI/Hepatic:  - neg GI/hepatic ROS       Renal/Genitourinary:  - ROS Renal section negative       Endo:     (+) thyroid problem .      Psychiatric:  - neg psychiatric ROS       Infectious Disease:  - neg infectious disease ROS       Malignancy:         Other:                     Physical Exam  Normal systems: dental    Airway   Mallampati: I  TM distance: >3 FB  Neck ROM: full    Dental     Cardiovascular   Rhythm and rate: regular and normal      Pulmonary    breath sounds clear to auscultation                    Anesthesia Plan      History & Physical Review  History and physical reviewed and following examination; no interval change.    ASA Status:  2 .    NPO Status:  > 6 hours    Plan for MAC with Intravenous induction. Maintenance will be TIVA.  Reason for MAC:  Deep or markedly invasive procedure (G8)  PONV prophylaxis:  Ondansetron (or other 5HT-3) and Dexamethasone or Solumedrol       Postoperative Care  Postoperative pain management:  Oral pain medications and Multi-modal analgesia.      Consents  Anesthetic plan, risks, benefits and alternatives discussed with:  Patient..                          .

## 2017-08-09 NOTE — OP NOTE
SURGEON: Christiano Wallace MD   ASSISTANT: oHma Osborne MD  ASSISTANT: Colt Watkins MD  PREOPERATIVE DIAGNOSES:   Lower eyelid retraction, bilateral  POSTOPERATIVE DIAGNOSES:    Bilateral lower eyelid retraction, bilateral   PROCEDURE PERFORMED:   Bilateral lower lid canthopexy and lateral tarsorrhaphy  ANESTHESIA: Monitored, with local infiltration of a 50:50 mixture of 2% lidocaine with epinephrine and 0.5% Marcaine.   COMPLICATIONS: None.   ESTIMATED BLOOD LOSS: Less than 5 cc.   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  After the risks, benefits and alternatives to the proposed procedure were explained, informed consent was obtained.   PROCEDURE: Jefe Herrera  was brought to the operating room and placed supine on the operating table. IV sedation was given. Lateral canthal areas were infiltrated with local anesthetic. The area was prepped and draped in the typical fashion. Attention was directed to the right side. A lateral canthal incision was then made on the right side. Dissection was carried down to the orbicularis with high temperature cautery. The lateral canthal tendon was identified and dissected out using Merissa scissors. The upper and lower grey lines were incised for the lateral 3 mm with the #15 blade.  The posterior marginal epithelium was removed with the Merissa scissors.  The tarsal plates were sutured together with 6-0 Vicryl sutures. The skin was closed with 6-0 plain gut sutures. Lateral canthal tendon was then secured to the lateral orbital rim periosteum with 5-0 PDS  suture in an interrupted fashion. Skin was closed with interrupted 6-0 plain gut sutures. Attention was directed to the left lower lid where the lateral canthal procedure was performed. The patient tolerated the procedure well. Erythromycin ophthalmic ointment was applied to the incision sites, and she left the operating room in stable condition.     CHRISTIANO  MD DANYEL

## 2017-08-09 NOTE — IP AVS SNAPSHOT
[facility]    [address] [place] [address]    Phone:  [phone]    Fax:  [phone]                                       After Visit Summary   8/9/2017    Jefe Herrera    MRN: [id number]           After Visit Summary Signature Page     I have received my discharge instructions, and my questions have been answered. I have discussed any challenges I see with this plan with the nurse or doctor.    ..........................................................................................................................................  Patient/Patient Representative Signature      ..........................................................................................................................................  Patient Representative Print Name and Relationship to Patient    ..................................................               ................................................  Date                                            Time    ..........................................................................................................................................  Reviewed by Signature/Title    ...................................................              ..............................................  Date                                                            Time

## 2017-08-09 NOTE — ANESTHESIA POSTPROCEDURE EVALUATION
Patient: Jefe Herrera    Procedure(s):  Bilateral Lateral Canthoplasty - Wound Class: I-Clean    Diagnosis:Lagophthalmos  Diagnosis Additional Information: No value filed.    Anesthesia Type:  MAC    Note:  Anesthesia Post Evaluation    Patient location during evaluation: bedside  Patient participation: Able to fully participate in evaluation  Level of consciousness: awake  Pain management: adequate  Airway patency: patent  Cardiovascular status: acceptable  Respiratory status: acceptable  Hydration status: acceptable  PONV: controlled     Anesthetic complications: None          Last vitals:  Vitals:    08/09/17 1004 08/09/17 1236 08/09/17 1250   BP: 125/70 110/72 120/75   Resp: 16 16 16   Temp: 36.9  C (98.5  F) 36.3  C (97.3  F)    SpO2: 95% 95% 98%         Electronically Signed By: Servando Sanders MD, MD  August 9, 2017  1:01 PM

## 2017-08-10 ENCOUNTER — TELEPHONE (OUTPATIENT)
Dept: OPHTHALMOLOGY | Facility: CLINIC | Age: 37
End: 2017-08-10

## 2017-08-10 NOTE — TELEPHONE ENCOUNTER
Telephone call to Jefe Herrera    Doing well with no pain, good vision, and no bleeding. All questions were answered, she is doing well, and postoperative care was reviewed.  A postop appointment has been scheduled.    Homa Osborne

## 2017-08-15 ENCOUNTER — OFFICE VISIT (OUTPATIENT)
Dept: OPHTHALMOLOGY | Facility: CLINIC | Age: 37
End: 2017-08-15

## 2017-08-15 DIAGNOSIS — Z98.890 POSTOPERATIVE EYE STATE: Primary | ICD-10-CM

## 2017-08-15 ASSESSMENT — VISUAL ACUITY
OD_SC: 20/20
OS_SC: 20/20
METHOD: SNELLEN - LINEAR

## 2017-08-15 NOTE — PROGRESS NOTES
Chief Complaints and History of Present Illnesses   Patient presents with     Post Op (Ophthalmology) Both Eyes     s/p BLL canthopexy and lateral tarsorrhaphy 8/9/17       Patient here POD6 s/p bilateral lower lid canthopexy and lateral tarsorrhaphy with complaints of itching where the sutures were placed temporally. Incisions are clean dry and intact and patient has no other concerns.        Assessment & Plan     Jefe Herrera is a 36 year old female with the following diagnoses:   1. Postoperative eye state - Both Eyes       Skin plain gut sutures removed temporally (3 left, 1 right)  Return to post-op follow-up appointment with Dr. Rodrigo Watkins M.D.  PGY-2           I did not see the patient, but was available. - Vane Moreland

## 2017-08-15 NOTE — MR AVS SNAPSHOT
After Visit Summary   8/15/2017    Jefe Herrera    MRN: [id number]           Patient Information     Date Of Birth          1980        Visit Information        Provider Department      8/15/2017 11:15 AM Vane Moreland MD [facility] Ophthalmology        Today's Diagnoses     Postoperative eye state - Both Eyes    -  1       Follow-ups after your visit        Your next 10 appointments already scheduled     Aug 21, 2017 10:30 AM CDT   (Arrive by 10:15 AM)   Post-Op with MD JAI [facility]hthal[facility] ([facility])    [address] [place] [id number] [phone]              Who to contact     Please call your clinic at [phone] to:    Ask questions about your health    Make or cancel appointments    Discuss your medicines    Learn about your test results    Speak to your doctor   If you have compliments or concerns about an experience at your clinic, or if you wish to file a complaint, please contact [facility] Patient Relations at [phone] or email us at [email or web address]         Additional Information About Your Visit        MyChart Information     [employer] gives you secure access to your electronic health record. If you see a primary care provider, you can also send messages to your care team and make appointments. If you have questions, please call your primary care clinic.  If you do not have a primary care provider, please call [phone] and they will assist you.      [employer] is an electronic gateway that provides easy, online access to your medical records. With [employer], you can request a clinic appointment, read your test results, renew a prescription or communicate with your care team.     To access your existing account, please contact your [facility] Clinic or call [phone] for assistance.        Care EveryWhere ID     This is your Care  EveryWhere ID. This could be used by other organizations to access your [place] medical records  [id number]         Blood Pressure from Last 3 Encounters:   08/09/17 126/76   02/15/16 113/66   02/10/16 95/83    Weight from Last 3 Encounters:   08/09/17 78.9 kg (174 lb)   02/10/16 79.2 kg (174 lb 9.7 oz)   04/03/15 68 kg (150 lb)              Today, you had the following     No orders found for display         Today's Medication Changes          These changes are accurate as of: 8/15/17 11:36 AM.  If you have any questions, ask your nurse or doctor.               These medicines have changed or have updated prescriptions.        Dose/Directions    erythromycin ophthalmic ointment   Commonly known as:  ROMYCIN   This may have changed:  Another medication with the same name was removed. Continue taking this medication, and follow the directions you see here.   Used for:  Postoperative eye state        Apply to skin incisions three times daily and into the eye at bedtime.   Quantity:  3.5 g   Refills:  0         Stop taking these medicines if you haven't already. Please contact your care team if you have questions.     HYDROcodone-acetaminophen 5-325 MG per tablet   Commonly known as:  NORCO   Stopped by:  Vane Moreland MD                    Primary Care Provider Office Phone # Fax #    Travis Gooden [phone]       [facility] [address] [place] [address]        Equal Access to Services     JERED RANGEL : Marshal Tan, waaxda isabella, qaybta kaalmc velazquez. So [place] -[phone].    ATENCIÓN: Si habla español, tiene a doe disposición servicios gratuitos de asistencia lingüística. Alistair al [phone].    We comply with applicable federal civil rights laws and [place] laws. We do not discriminate on the basis of race, color, national origin, age, disability sex, sexual orientation or gender identity.            Thank  you!     Thank you for choosing [facility] OPHTHALMOLOGY  for your care. Our goal is always to provide you with excellent care. Hearing back from our patients is one way we can continue to improve our services. Please take a few minutes to complete the written survey that you may receive in the mail after your visit with us. Thank you!             Your Updated Medication List - Protect others around you: Learn how to safely use, store and throw away your medicines at www.disposemymeds.org.          This list is accurate as of: 8/15/17 11:36 AM.  Always use your most recent med list.                   Brand Name Dispense Instructions for use Diagnosis    * REFRESH P.M. Oint      Apply to eye At Bedtime Both eyes.        * Artificial Tear Ointment Oint     2 Tube    Apply 1 Application to eye 7 times daily    Thyroid eye disease       BIOTIN PO      Take by mouth daily as needed        carboxymethylcellulose 0.5 % Soln ophthalmic solution    REFRESH PLUS     1 drop as needed        cycloSPORINE 0.05 % ophthalmic emulsion    RESTASIS    1 Box    Place 1 drop into both eyes 2 times daily    Dry eye syndrome, bilateral, Lagophthalmos, right       erythromycin ophthalmic ointment    ROMYCIN    3.5 g    Apply to skin incisions three times daily and into the eye at bedtime.    Postoperative eye state       IBUPROFEN PO      Take 200 mg by mouth every 6 hours as needed for moderate pain        LORAZEPAM PO      Take 0.5 mg by mouth every 8 hours as needed        neomycin-polymyxin-dexamethasone 3.5-96853-5.1 Susp ophthalmic susp    MAXITROL    1 Bottle    Place 1 drop into both eyes 4 times daily    Postoperative eye state       ondansetron 4 MG ODT tab    ZOFRAN-ODT     Take by mouth every 8 hours as needed        prednisoLONE acetate 1 % ophthalmic susp    PRED FORTE    1 Bottle    Place 1 drop into both eyes 2 times daily    Dry eye syndrome, bilateral       PRILOSEC PO      Take 20 mg by mouth every morning        TIROSINT  PO      Take 125 mcg by mouth daily        UNABLE TO FIND      Inject into the muscle every 3 months MEDICATION NAME: Depo Prevara every 3 months IM hip        * Notice:  This list has 2 medication(s) that are the same as other medications prescribed for you. Read the directions carefully, and ask your doctor or other care provider to review them with you.

## 2017-08-15 NOTE — NURSING NOTE
"Chief Complaint   Patient presents with     Post Op (Ophthalmology) Both Eyes     s/p BLL canthopexy and lateral tarsorrhaphy 8/9/17     HPI    Affected eye(s):  Both   Symptoms:     Foreign body sensation      Duration:  1 day   Frequency:  Constant       Do you have eye pain now?:  No      Comments:  Woke this am with FB sensation right eye.  \"Feels like a stitch\"  Mentions she sneezed hard yesterday and felt a \"pop\" right eye.  Still using erythro emmanuel TID, and maxitrol qtts QID BE.  Hanh Swan RN 11:08 AM 08/15/17                 "

## 2017-08-21 ENCOUNTER — OFFICE VISIT (OUTPATIENT)
Dept: OPHTHALMOLOGY | Facility: CLINIC | Age: 37
End: 2017-08-21

## 2017-08-21 DIAGNOSIS — E07.9 THYROID EYE DISEASE: ICD-10-CM

## 2017-08-21 DIAGNOSIS — H57.89 THYROID EYE DISEASE: ICD-10-CM

## 2017-08-21 DIAGNOSIS — H02.539 EYELID RETRACTION OR LAG: ICD-10-CM

## 2017-08-21 DIAGNOSIS — Z98.890 POSTOPERATIVE EYE STATE: Primary | ICD-10-CM

## 2017-08-21 ASSESSMENT — TONOMETRY
OS_IOP_MMHG: 18
IOP_METHOD: ICARE
OD_IOP_MMHG: 17

## 2017-08-21 ASSESSMENT — VISUAL ACUITY
METHOD: SNELLEN - LINEAR
OD_SC: 20/20
OS_SC+: -2
OS_SC: 20/20
OD_SC+: -1

## 2017-08-21 NOTE — PROGRESS NOTES
Chief Complaints and History of Present Illnesses   Patient presents with     Post Op (Ophthalmology) Both Eyes     s/p Bilateral lower lid canthopexy and lateral tarsorrhaphy on 8/9/2017       POW2 s/p b/l lower lid canthopexy and lateral tarso. Incisions are healing nicely and she has no complaints. She has less dry eye symptoms and can sleep better now.     Assessment & Plan     Jefe Herrera is a 36 year old female with the following diagnoses:   1. Postoperative eye state - Both Eyes    2. Thyroid eye disease - Both Eyes    3. Eyelid retraction or lag       Continue erythromycin ointment, massage, and warm compresses  Can d/c maxitrol  RTC 6 weeks    Colt Watkins M.D.  PGY-2    Attending Physician Attestation:  I have seen and examined this patient .  I have confirmed and edited as necessary the chief complaint(s), history of present illness, review of systems, relevant history, and examination findings as documented by others.  I have personally reviewed the relevant tests, images, and reports as documented above.  I have confirmed and edited as necessary the assessment and plan and agree with this note.    - Momo Wallace MD 11:02 AM 8/21/2017

## 2017-08-21 NOTE — MR AVS SNAPSHOT
After Visit Summary   8/21/2017    Jefe Herrera    MRN: [id number]           Patient Information     Date Of Birth          1980        Visit Information        Provider Department      8/21/2017 10:30 AM Momo Wallace MD [facility]        Today's Diagnoses     Postoperative eye state - Both Eyes    -  1    Thyroid eye disease - Both Eyes        Eyelid retraction or lag           Follow-ups after your visit        Follow-up notes from your care team     Return in about 6 weeks (around 10/2/2017).      Your next 10 appointments already scheduled     Oct 09, 2017 10:15 AM CDT   (Arrive by 10:00 AM)   Post-Op with Momo Wallace MD   [facility] ([facility] [place])    [address] [address] [phone]              Who to contact     Please call your clinic at [phone] to:    Ask questions about your health    Make or cancel appointments    Discuss your medicines    Learn about your test results    Speak to your doctor   If you have compliments or concerns about an experience at your clinic, or if you wish to file a complaint, please contact [facility] Patient Relations at [phone] or email us at [email or web address]         Additional Information About Your Visit        MyChart Information     [employer] gives you secure access to your electronic health record. If you see a primary care provider, you can also send messages to your care team and make appointments. If you have questions, please call your primary care clinic.  If you do not have a primary care provider, please call [phone] and they will assist you.      [employer]) is an electronic gateway that provides easy, online access to your medical records. With [employer]), you can request a clinic appointment, read your test results, renew a prescription or communicate with your care team.     To access your existing  account, please contact your [facility] or call [phone] for assistance.        Care EveryWhere ID     This is your Care EveryWhere ID. This could be used by other organizations to access your [place] medical records  [id number]         Blood Pressure from Last 3 Encounters:   08/09/17 126/76   02/15/16 113/66   02/10/16 95/83    Weight from Last 3 Encounters:   08/09/17 78.9 kg (174 lb)   02/10/16 79.2 kg (174 lb 9.7 oz)   04/03/15 68 kg (150 lb)              Today, you had the following     No orders found for display       Primary Care Provider Office Phone # Fax #    Travis Gooden [phone]       [place] [address] [address] [facility] [address]        Equal Access to Services     JERED RANGEL : Hadii gilbert roe hadasho Soomaali, waaxda luqadaha, qaybta kaalmada adeegyada, waxcary boss haysuzi mccrary . So [facility] [phone].    ATENCIÓN: Si habla español, tiene a doe disposición servicios gratuitos de asistencia lingüística. Alistair al [phone].    We comply with applicable federal civil rights laws and [place] laws. We do not discriminate on the basis of race, color, national origin, age, disability sex, sexual orientation or gender identity.            Thank you!     Thank you for choosing [facility] OPHTHALM[facility]  for your care. Our goal is always to provide you with excellent care. Hearing back from our patients is one way we can continue to improve our services. Please take a few minutes to complete the written survey that you may receive in the mail after your visit with us. Thank you!             Your Updated Medication List - Protect others around you: Learn how to safely use, store and throw away your medicines at www.disposemymeds.org.          This list is accurate as of: 8/21/17 11:06 AM.  Always use your most recent med list.                   Brand Name Dispense Instructions for use Diagnosis    * REFRESH P.M. Oint      Apply to eye  At Bedtime Both eyes.        * Artificial Tear Ointment Oint     2 Tube    Apply 1 Application to eye 7 times daily    Thyroid eye disease       BIOTIN PO      Take by mouth daily as needed        carboxymethylcellulose 0.5 % Soln ophthalmic solution    REFRESH PLUS     1 drop as needed        cycloSPORINE 0.05 % ophthalmic emulsion    RESTASIS    1 Box    Place 1 drop into both eyes 2 times daily    Dry eye syndrome, bilateral, Lagophthalmos, right       erythromycin ophthalmic ointment    ROMYCIN    3.5 g    Apply to skin incisions three times daily and into the eye at bedtime.    Postoperative eye state       IBUPROFEN PO      Take 200 mg by mouth every 6 hours as needed for moderate pain        LORAZEPAM PO      Take 0.5 mg by mouth every 8 hours as needed        neomycin-polymyxin-dexamethasone 3.5-34422-8.1 Susp ophthalmic susp    MAXITROL    1 Bottle    Place 1 drop into both eyes 4 times daily    Postoperative eye state       ondansetron 4 MG ODT tab    ZOFRAN-ODT     Take by mouth every 8 hours as needed        prednisoLONE acetate 1 % ophthalmic susp    PRED FORTE    1 Bottle    Place 1 drop into both eyes 2 times daily    Dry eye syndrome, bilateral       PRILOSEC PO      Take 20 mg by mouth every morning        TIROSINT PO      Take 125 mcg by mouth daily        UNABLE TO FIND      Inject into the muscle every 3 months MEDICATION NAME: Depo Prevara every 3 months IM hip        * Notice:  This list has 2 medication(s) that are the same as other medications prescribed for you. Read the directions carefully, and ask your doctor or other care provider to review them with you.

## 2017-08-21 NOTE — NURSING NOTE
Chief Complaints and History of Present Illnesses   Patient presents with     Post Op (Ophthalmology) Both Eyes     s/p Bilateral lower lid canthopexy and lateral tarsorrhaphy on 8/9/2017     HPI    Affected eye(s):  Both   Symptoms:     No blurred vision   Itching      Frequency:  Constant       Do you have eye pain now?:  No      Comments:  12 day postop s/p Bilateral lower lid canthopexy and lateral tarsorrhaphy on 8/9/2017. Pt states forgot ointment, eye feel itchy. Feels like there is more comfort at night when sleeping and doesn't feel as dry. No pain.     Deborah Clay COT 10:48 AM August 21, 2017

## 2017-11-13 ENCOUNTER — OFFICE VISIT (OUTPATIENT)
Dept: OPHTHALMOLOGY | Facility: CLINIC | Age: 37
End: 2017-11-13

## 2017-11-13 DIAGNOSIS — H04.123 DRY EYE SYNDROME, BILATERAL: ICD-10-CM

## 2017-11-13 DIAGNOSIS — Z98.890 POSTOPERATIVE EYE STATE: Primary | ICD-10-CM

## 2017-11-13 DIAGNOSIS — H57.89 THYROID EYE DISEASE: ICD-10-CM

## 2017-11-13 DIAGNOSIS — E07.9 THYROID EYE DISEASE: ICD-10-CM

## 2017-11-13 ASSESSMENT — MARGIN REFLEX DISTANCE
OD_MRD2: 5
OS_MRD2: 5
OS_MRD1: 4
OD_MRD1: 4

## 2017-11-13 ASSESSMENT — VISUAL ACUITY
OS_SC: 20/20
METHOD: SNELLEN - LINEAR
OD_SC: 20/20

## 2017-11-13 ASSESSMENT — TONOMETRY
OD_IOP_MMHG: 20
IOP_METHOD: ICARE
OS_IOP_MMHG: 21

## 2017-11-13 NOTE — LETTER
2017         RE:  :  MRN: Jefe Herrera  1980  [id number]     Dear  Established Patient,    Your patient, Jefe Herrera, returned for oculoplastic follow up. My assessment and plan are below.  For further details, please see my attached clinic note.    Jefe Herrera is 12 weeks status post bilateral/l lower lid canthopexy and lateral tarso  The incision(s) are healing well.  The lid(s)  are  in excellent position.    Recent elevation in TSI per outside endo lab noted on . Received 3 IV infusions methylprednisone (500mg/250/250) (10/2-10/16) stopped / mood swings. Denies diplopia. C/o blurry vision, improved with rubbing.  Using AT 3-5x/daily and refresh pm at bedtime.Repeat TSI pending.  Assessment & Plan   Jefe Herrera is a 36 year old female with the following diagnoses:   1. Postoperative eye state - Both Eyes    2. Thyroid eye disease - Both Eyes    3. Dry eye syndrome, bilateral - Both Eyes      She has pictures that show significant periorbital swelling Oct 14 then improved after IV steroids. Her exam is stable today.   1. Spontaneous orbital pain.       2. Gaze evoked orbital pain.       3. Eyelid swelling due to active thyroid eye disease    4. Eyelid erythema.         5. Conjunctival redness due to active thyroid eye disease .   6. Chemosis.          7. Inflammation of caruncle OR plica.      8. Increase of > 2mm in proptosis.      9. Decrease in uniocular excursion in any direction of > 8 .   10. Decrease of acuity equivalent to 1 Snellen line.     SHAYY SCORE = 2    PLAN:  * Continue artificial tears FOUR TIMES A DAY and refresh pm at bedtime  *  Return to clinic - THYROID EYE DISEASE clinic 3 months           Again, thank you for allowing me to participate in the care of your patient.      Sincerely,    Momo Wallace MD  Department of Ophthalmology and Visual Neurosciences  [facility]      CC: Alecia Gaston NP  [facility]  [address]  [place] [address]  VIA Facsimile: [phone]     Travis Gooden  [facility] [employer]  [address] [place] [address]  VIA Facsimile: [phone]

## 2017-11-13 NOTE — MR AVS SNAPSHOT
After Visit Summary   11/13/2017    Jefe Herrera    MRN: [id number]           Patient Information     Date Of Birth          1980        Visit Information        Provider Department      11/13/2017 9:00 AM Momo Wallace MD [facility]        Today's Diagnoses     Postoperative eye state - Both Eyes    -  1    Thyroid eye disease - Both Eyes        Dry eye syndrome, bilateral - Both Eyes           Follow-ups after your visit        Follow-up notes from your care team     Return in about 2 months (around 1/13/2018) for [facility].      Your next 10 appointments already scheduled     Jan 29, 2018  2:30 PM CST   RETURN THYROID EYE with Natalio Oropeza MD   [facility] ([facility])    [address] [facility] [address]  [address] [address] [phone]              Who to contact     Please call your clinic at [phone] to:    Ask questions about your health    Make or cancel appointments    Discuss your medicines    Learn about your test results    Speak to your doctor   If you have compliments or concerns about an experience at your clinic, or if you wish to file a complaint, please contact [facility] Patient Relations at [phone] or email us at [email or web address]         Additional Information About Your Visit        MyChart Information     [employer] gives you secure access to your electronic health record. If you see a primary care provider, you can also send messages to your care team and make appointments. If you have questions, please call your primary care clinic.  If you do not have a primary care provider, please call [phone] and they will assist you.      [employer] is an electronic gateway that provides easy, online access to your medical records. With [employer], you can request a clinic appointment, read your test results, renew a prescription or communicate with your care team.     To access  your existing account, please contact your [facility] or call [phone] for assistance.        Care EveryWhere ID     This is your Care EveryWhere ID. This could be used by other organizations to access your [place] medical records  [id number]         Blood Pressure from Last 3 Encounters:   08/09/17 126/76   02/15/16 113/66   02/10/16 95/83    Weight from Last 3 Encounters:   08/09/17 78.9 kg (174 lb)   02/10/16 79.2 kg (174 lb 9.7 oz)   04/03/15 68 kg (150 lb)              Today, you had the following     No orders found for display       Primary Care Provider Office Phone # Fax #    Travis Gooden [phone]       [place] [address] [address] [facility] [address]        Equal Access to Services     JERED RANGEL : Hadii gilbert roe hadasho Soomaali, waaxda luqadaha, qaybta kaalmada adeegyada, waxcary mccrary . So [facility] [phone].    ATENCIÓN: Si habla español, tiene a doe disposición servicios gratuitos de asistencia lingüística. Alistair al [phone].    We comply with applicable federal civil rights laws and [place] laws. We do not discriminate on the basis of race, color, national origin, age, disability, sex, sexual orientation, or gender identity.            Thank you!     Thank you for choosing [facility] OPHTHALMOLOG[facility]  for your care. Our goal is always to provide you with excellent care. Hearing back from our patients is one way we can continue to improve our services. Please take a few minutes to complete the written survey that you may receive in the mail after your visit with us. Thank you!             Your Updated Medication List - Protect others around you: Learn how to safely use, store and throw away your medicines at www.disposemymeds.org.          This list is accurate as of: 11/13/17 10:01 AM.  Always use your most recent med list.                   Brand Name Dispense Instructions for use Diagnosis    * REFRESH P.M. Oint       Apply to eye At Bedtime Both eyes.        * Artificial Tear Ointment Oint     2 Tube    Apply 1 Application to eye 7 times daily    Thyroid eye disease       BIOTIN PO      Take by mouth daily as needed        carboxymethylcellulose 0.5 % Soln ophthalmic solution    REFRESH PLUS     1 drop as needed        cycloSPORINE 0.05 % ophthalmic emulsion    RESTASIS    1 Box    Place 1 drop into both eyes 2 times daily    Dry eye syndrome, bilateral, Lagophthalmos, right       erythromycin ophthalmic ointment    ROMYCIN    3.5 g    Apply to skin incisions three times daily and into the eye at bedtime.    Postoperative eye state       IBUPROFEN PO      Take 200 mg by mouth every 6 hours as needed for moderate pain        LORAZEPAM PO      Take 0.5 mg by mouth every 8 hours as needed        neomycin-polymyxin-dexamethasone 3.5-46958-0.1 Susp ophthalmic susp    MAXITROL    1 Bottle    Place 1 drop into both eyes 4 times daily    Postoperative eye state       ondansetron 4 MG ODT tab    ZOFRAN-ODT     Take by mouth every 8 hours as needed        prednisoLONE acetate 1 % ophthalmic susp    PRED FORTE    1 Bottle    Place 1 drop into both eyes 2 times daily    Dry eye syndrome, bilateral       PRILOSEC PO      Take 20 mg by mouth every morning        TIROSINT PO      Take 125 mcg by mouth daily        UNABLE TO FIND      Inject into the muscle every 3 months MEDICATION NAME: Depo Prevara every 3 months IM hip        * Notice:  This list has 2 medication(s) that are the same as other medications prescribed for you. Read the directions carefully, and ask your doctor or other care provider to review them with you.

## 2017-11-13 NOTE — NURSING NOTE
Chief Complaints and History of Present Illnesses   Patient presents with     Post Op (Ophthalmology) Both Eyes     s/p Bilateral lower lid canthopexy and lateral tarsorrhaphy on 8/9/2017     HPI    Affected eye(s):  Both   Symptoms:        Duration:  2 months   Frequency:  Constant       Do you have eye pain now?:  No      Comments:  Pt. States that RLL has been sore in the corner for the last 2 days.  No c/o comfort LE.  VA has been fluctuating.   Teresa Bryan COT 9:02 AM November 13, 2017

## 2017-11-13 NOTE — PROGRESS NOTES
Chief Complaints and History of Present Illnesses   Patient presents with     Post Op (Ophthalmology) Both Eyes     s/p Bilateral lower lid canthopexy and lateral tarsorrhaphy on 8/9/2017     Jefe Herrera is 12 weeks status post bilateral/l lower lid canthopexy and lateral tarso  The incision(s) are healing well.  The lid(s)  are  in excellent position.    Recent elevation in TSI per outside endo lab noted on 8/29. Received 3 IV infusions methylprednisone (500mg/250/250) (10/2-10/16) stopped 2/2 mood swings. Denies diplopia. C/o blurry vision, improved with rubbing.  Using AT 3-5x/daily and refresh pm at bedtime.    Repeat TSI pending.      Assessment & Plan     Jefe Herrera is a 36 year old female with the following diagnoses:   1. Postoperative eye state - Both Eyes    2. Thyroid eye disease - Both Eyes    3. Dry eye syndrome, bilateral - Both Eyes      She has pictures that show significant periorbital swelling Oct 14 then improved after IV steroids. Her exam is stable today.   1. Spontaneous orbital pain.       2. Gaze evoked orbital pain.       3. Eyelid swelling due to active thyroid eye disease    4. Eyelid erythema.         5. Conjunctival redness due to active thyroid eye disease .   6. Chemosis.          7. Inflammation of caruncle OR plica.      8. Increase of > 2mm in proptosis.      9. Decrease in uniocular excursion in any direction of > 8 .   10. Decrease of acuity equivalent to 1 Snellen line.     SHAYY SCORE = 2    PLAN:  * Continue artificial tears FOUR TIMES A DAY and refresh pm at bedtime  *  Return to clinic - THYROID EYE DISEASE clinic 3 months    Attending Physician Attestation:  I have seen and examined this patient.  I have confirmed and edited as necessary the chief complaint(s), history of present illness, review of systems, relevant history, and examination findings as documented by others.  I have personally reviewed the relevant tests, images, and reports as documented above.  I have  confirmed and edited as necessary the assessment and plan and agree with this note.    - Momo Wallace MD 9:36 AM 11/13/2017

## 2017-12-03 ENCOUNTER — HEALTH MAINTENANCE LETTER (OUTPATIENT)
Age: 37
End: 2017-12-03

## 2018-01-29 ENCOUNTER — OFFICE VISIT (OUTPATIENT)
Dept: OPHTHALMOLOGY | Facility: CLINIC | Age: 38
End: 2018-01-29
Attending: OPHTHALMOLOGY
Payer: MEDICARE

## 2018-01-29 DIAGNOSIS — H50.32 INTERMITTENT ESOTROPIA, ALTERNATING: ICD-10-CM

## 2018-01-29 DIAGNOSIS — H50.21 HYPOTROPIA OF RIGHT EYE: Primary | ICD-10-CM

## 2018-01-29 PROCEDURE — G0463 HOSPITAL OUTPT CLINIC VISIT: HCPCS | Mod: 25,ZF | Performed by: TECHNICIAN/TECHNOLOGIST

## 2018-01-29 PROCEDURE — 92060 SENSORIMOTOR EXAMINATION: CPT | Mod: ZF | Performed by: OPHTHALMOLOGY

## 2018-01-29 ASSESSMENT — EXTERNAL EXAM - RIGHT EYE: OD_EXAM: NORMAL

## 2018-01-29 ASSESSMENT — TONOMETRY
OD_IOP_MMHG: 14
IOP_METHOD: ICARE - MM/KS
OS_IOP_MMHG: 13

## 2018-01-29 ASSESSMENT — VISUAL ACUITY
METHOD: SNELLEN - LINEAR
OD_SC: 20/20
OS_SC: 20/20

## 2018-01-29 ASSESSMENT — CONF VISUAL FIELD
OD_NORMAL: 1
OS_NORMAL: 1

## 2018-01-29 ASSESSMENT — EXTERNAL EXAM - LEFT EYE: OS_EXAM: NORMAL

## 2018-01-29 NOTE — MR AVS SNAPSHOT
After Visit Summary   1/29/2018    Jefe Herrera    MRN: [id number]           Patient Information     Date Of Birth          1980        Visit Information        Provider Department      1/29/2018 2:30 PM Natalio Oropeza MD [facility]        Today's Diagnoses     Hypotropia of right eye    -  1    Intermittent esotropia, alternating           Follow-ups after your visit        Follow-up notes from your care team     Return in about 4 months (around 5/29/2018) for Vision, tension color, thyroid eye clinic - Mondays.      Future tests that were ordered for you today     Open Future Orders        Priority Expected Expires Ordered    CT Temporal Orbital Sella w/o Contrast Routine  1/29/2019 1/29/2018    Thyroid stimulating immunoglobulin Routine  4/29/2018 1/29/2018            Who to contact     Please call your clinic at [phone] to:    Ask questions about your health    Make or cancel appointments    Discuss your medicines    Learn about your test results    Speak to your doctor   If you have compliments or concerns about an experience at your clinic, or if you wish to file a complaint, please contact [facility] Patient Relations at [phone] or email us at [email or web address]         Additional Information About Your Visit        MyChart Information     [employer] gives you secure access to your electronic health record. If you see a primary care provider, you can also send messages to your care team and make appointments. If you have questions, please call your primary care clinic.  If you do not have a primary care provider, please call [phone] and they will assist you.      [employer] is an electronic gateway that provides easy, online access to your medical records. With [employer], you can request a clinic appointment, read your test results, renew a prescription or communicate with your care team.     To access your existing  account, please contact your [facility] or call [phone] for assistance.        Care EveryWhere ID     This is your Care EveryWhere ID. This could be used by other organizations to access your [place] medical records  [id number]         Blood Pressure from Last 3 Encounters:   08/09/17 126/76   02/15/16 113/66   02/10/16 95/83    Weight from Last 3 Encounters:   08/09/17 78.9 kg (174 lb)   02/10/16 79.2 kg (174 lb 9.7 oz)   04/03/15 68 kg (150 lb)              We Performed the Following     Sensorimotor        Primary Care Provider Office Phone # Fax #    Travis Gooden [phone]       [place] [address] [facility] [address]        Equal Access to Services     JERED RANGEL : Hadii gilbert ku hadasho Soomaali, waaxda luqadaha, qaybta kaalmada adeegyada, waxcary bandain haysuzi mccrary . So [facility] [phone].    ATENCIÓN: Si habla español, tiene a doe disposición servicios gratuitos de asistencia lingüística. Llame al [phone].    We comply with applicable federal civil rights laws and [place] laws. We do not discriminate on the basis of race, color, national origin, age, disability, sex, sexual orientation, or gender identity.            Thank you!     Thank you for choosing [facility] EYE GENERAL  for your care. Our goal is always to provide you with excellent care. Hearing back from our patients is one way we can continue to improve our services. Please take a few minutes to complete the written survey that you may receive in the mail after your visit with us. Thank you!             Your Updated Medication List - Protect others around you: Learn how to safely use, store and throw away your medicines at www.disposemymeds.org.          This list is accurate as of 1/29/18  3:19 PM.  Always use your most recent med list.                   Brand Name Dispense Instructions for use Diagnosis    * REFRESH P.M. Oint      Apply to eye At Bedtime Both  eyes.        * Artificial Tear Ointment Oint     2 Tube    Apply 1 Application to eye 7 times daily    Thyroid eye disease       BIOTIN PO      Take by mouth daily as needed        carboxymethylcellulose 0.5 % Soln ophthalmic solution    REFRESH PLUS     1 drop as needed        cycloSPORINE 0.05 % ophthalmic emulsion    RESTASIS    1 Box    Place 1 drop into both eyes 2 times daily    Dry eye syndrome, bilateral, Lagophthalmos, right       erythromycin ophthalmic ointment    ROMYCIN    3.5 g    Apply to skin incisions three times daily and into the eye at bedtime.    Postoperative eye state       IBUPROFEN PO      Take 200 mg by mouth every 6 hours as needed for moderate pain        LORAZEPAM PO      Take 0.5 mg by mouth every 8 hours as needed        neomycin-polymyxin-dexamethasone 3.5-48564-1.1 Susp ophthalmic susp    MAXITROL    1 Bottle    Place 1 drop into both eyes 4 times daily    Postoperative eye state       ondansetron 4 MG ODT tab    ZOFRAN-ODT     Take by mouth every 8 hours as needed        prednisoLONE acetate 1 % ophthalmic susp    PRED FORTE    1 Bottle    Place 1 drop into both eyes 2 times daily    Dry eye syndrome, bilateral       PREDNISONE PO      Take 60 mg by mouth Taper for week.        PRILOSEC PO      Take 20 mg by mouth every morning        TIROSINT PO      Take 136 mcg by mouth daily        UNABLE TO FIND      Inject into the muscle every 3 months MEDICATION NAME: Depo Prevara every 3 months IM hip        * Notice:  This list has 2 medication(s) that are the same as other medications prescribed for you. Read the directions carefully, and ask your doctor or other care provider to review them with you.

## 2018-01-29 NOTE — PROGRESS NOTES
Assessment & Plan     Jefe Herrera is a 37 year old female with the following diagnoses:   1. Hypotropia of right eye    2. Intermittent esotropia, alternating       Follow up Thyroid eye disease status-post orbital decompression in 2015.  Status-post eyelid surgery.  Complains of worsening proptosis. Last thyroid stimulating immunoglobulin was in 2015 and was 6.8.    Her john exophthalmometry is getting worse.  Will get repeat CT and thyroid stimulating immunoglobulin.  Follow up 4 months.           Attending Physician Attestation:  Complete documentation of historical and exam elements from today's encounter can be found in the full encounter summary report (not reduplicated in this progress note).  I personally obtained the chief complaint(s) and history of present illness.  I confirmed and edited as necessary the review of systems, past medical/surgical history, family history, social history, and examination findings as documented by others; and I examined the patient myself.  I personally reviewed the relevant tests, images, and reports as documented above.  I formulated and edited as necessary the assessment and plan and discussed the findings and management plan with the patient and family. - Natalio Oropeza MD

## 2018-01-29 NOTE — NURSING NOTE
Chief Complaint   Patient presents with     Thyroid Disease     VA seems blurry, does not wear glasses. No diplopia. +redness/irritation/tearing - uses artificial tears PRN, nothing seems to help. Hard to drive at night, flare with headlights very bothersome. Levothyroxine 136 mcg daily.     HPI    Symptoms:     Redness   Foreign body sensation   Tearing         Do you have eye pain now?:  No

## 2018-05-21 ENCOUNTER — OFFICE VISIT (OUTPATIENT)
Dept: OPHTHALMOLOGY | Facility: CLINIC | Age: 38
End: 2018-05-21
Attending: OPHTHALMOLOGY
Payer: MEDICARE

## 2018-05-21 DIAGNOSIS — H57.89 THYROID EYE DISEASE: Primary | ICD-10-CM

## 2018-05-21 DIAGNOSIS — E07.9 THYROID EYE DISEASE: Primary | ICD-10-CM

## 2018-05-21 PROCEDURE — 92285 EXTERNAL OCULAR PHOTOGRAPHY: CPT | Mod: ZF | Performed by: OPHTHALMOLOGY

## 2018-05-21 PROCEDURE — G0463 HOSPITAL OUTPT CLINIC VISIT: HCPCS | Mod: ZF

## 2018-05-21 ASSESSMENT — CONF VISUAL FIELD
OD_NORMAL: 1
OS_NORMAL: 1
METHOD: COUNTING FINGERS

## 2018-05-21 ASSESSMENT — SLIT LAMP EXAM - LIDS
COMMENTS: NORMAL
COMMENTS: NORMAL

## 2018-05-21 ASSESSMENT — EXTERNAL EXAM - LEFT EYE: OS_EXAM: NORMAL

## 2018-05-21 ASSESSMENT — VISUAL ACUITY
OS_SC: 20/20
OD_SC: 20/25
METHOD: SNELLEN - LINEAR

## 2018-05-21 ASSESSMENT — TONOMETRY
OS_IOP_MMHG: 18
IOP_METHOD: ICARE
OD_IOP_MMHG: 18

## 2018-05-21 ASSESSMENT — EXTERNAL EXAM - RIGHT EYE: OD_EXAM: NORMAL

## 2018-05-21 NOTE — Clinical Note
5/21/2018      RE: Jefe Herrera  [address] [facility] [address]              Assessment & Plan     Jefe Herrera is a 37 year old female with the following diagnoses:   1. Thyroid eye disease - Both Eyes       Follow up Thyroid eye disease last visit was 4 months ago.  She is status-post orbital decompression and canthopexy. She notes worsening proptosis and eyelid retraction.  Her endocrinologist is considering rituxan vs. Enrolling in a trial.  She has [place] medicaid which is no longer accepted by [facility]. Her thyroid stimulating immunoglobulin has been elevated significantly for many months.      Today, her visual acuity is 20/25 RIGHT eye and 20/20 LEFT eye.  Her color vision and pupils are normal. There is no caruncular edema.  The cornea shows mild changes.      Overall, it appears that the Thyroid eye disease is reactivating.  Recommend follow up 4-6 months sooner as needed for worsening symptoms. Recommend use artificial tear drops.      Follow up 4 months.  Based on the change in the insurance, will need to refer her to Norma Comer at [facility].              Attending Physician Attestation:  Complete documentation of historical and exam elements from today's encounter can be found in the full encounter summary report (not reduplicated in this progress note).  I personally obtained the chief complaint(s) and history of present illness.  I confirmed and edited as necessary the review of systems, past medical/surgical history, family history, social history, and examination findings as documented by others; and I examined the patient myself.  I personally reviewed the relevant tests, images, and reports as documented above.  I formulated and edited as necessary the assessment and plan and discussed the findings and management plan with the patient and family. - Natalio Oropeza MD

## 2018-05-21 NOTE — PROGRESS NOTES
Assessment & Plan     Jefe Herrera is a 37 year old female with the following diagnoses:   1. Thyroid eye disease - Both Eyes       Follow up Thyroid eye disease last visit was 4 months ago.  She is status-post orbital decompression and canthopexy. She notes worsening proptosis and eyelid retraction.  Her endocrinologist is considering rituxan vs. Enrolling in a trial.  She has [place] medicaid which is no longer accepted by [facility]. Her thyroid stimulating immunoglobulin has been elevated significantly for many months.      Today, her visual acuity is 20/25 RIGHT eye and 20/20 LEFT eye.  Her color vision and pupils are normal. There is no caruncular edema.  The cornea shows mild changes.      Overall, it appears that the Thyroid eye disease is reactivating.  Recommend follow up 4-6 months sooner as needed for worsening symptoms. Recommend use artificial tear drops.      Follow up 4 months.  Based on the change in the insurance, will need to refer her to Norma Comer at [facility].              Attending Physician Attestation:  Complete documentation of historical and exam elements from today's encounter can be found in the full encounter summary report (not reduplicated in this progress note).  I personally obtained the chief complaint(s) and history of present illness.  I confirmed and edited as necessary the review of systems, past medical/surgical history, family history, social history, and examination findings as documented by others; and I examined the patient myself.  I personally reviewed the relevant tests, images, and reports as documented above.  I formulated and edited as necessary the assessment and plan and discussed the findings and management plan with the patient and family. - Natalio Oropeza MD

## 2018-05-21 NOTE — NURSING NOTE
Chief Complaint   Patient presents with     Thyroid Disease     Some eye pain, constant both eyes. Eyes seems blurred at times, ATS are on back order (refresh),Diplopia has resolved. Using generic version and seems worse.

## 2018-05-21 NOTE — MR AVS SNAPSHOT
After Visit Summary   5/21/2018    Jefe Herrera    MRN: [id number]           Patient Information     Date Of Birth          1980        Visit Information        Provider Department      5/21/2018 2:00 PM Natalio Oropeza MD [facility] Eye General        Today's Diagnoses     Thyroid eye disease - Both Eyes    -  1       Follow-ups after your visit        Who to contact     Please call your clinic at [phone] to:    Ask questions about your health    Make or cancel appointments    Discuss your medicines    Learn about your test results    Speak to your doctor            Additional Information About Your Visit        MyChart Information     [employer]isk gives you secure access to your electronic health record. If you see a primary care provider, you can also send messages to your care team and make appointments. If you have questions, please call your primary care clinic.  If you do not have a primary care provider, please call [phone] and they will assist you.      A[employer]isk is an electronic gateway that provides easy, online access to your medical records. With [employer], you can request a clinic appointment, read your test results, renew a prescription or communicate with your care team.     To access your existing account, please contact your [facility] or call [phone] for assistance.        Care EveryWhere ID     This is your Care EveryWhere ID. This could be used by other organizations to access your [place] medical records  [id number]         Blood Pressure from Last 3 Encounters:   08/09/17 126/76   02/15/16 113/66   02/10/16 95/83    Weight from Last 3 Encounters:   08/09/17 78.9 kg (174 lb)   02/10/16 79.2 kg (174 lb 9.7 oz)   04/03/15 68 kg (150 lb)              We Performed the Following     External Photos Thyroid Series        Primary Care Provider Office Phone # Fax #    Travis Giacomo [phone]       [place] [id number]  [address]  [facility] [address]        Equal Access to Services     MARK RANGEL : Hadii gilbert roe aggie Tan, watamirda luqanaha, qaphilippeta edwardgarymc rainesanastaciacaren titus. So [facility] [phone].    ATENCIÓN: Si habla español, tiene a doe disposición servicios gratuitos de asistencia lingüística. [facility] [phone].    We comply with applicable federal civil rights laws and [place] laws. We do not discriminate on the basis of race, color, national origin, age, disability, sex, sexual orientation, or gender identity.            Thank you!     Thank you for choosing [facility]  for your care. Our goal is always to provide you with excellent care. Hearing back from our patients is one way we can continue to improve our services. Please take a few minutes to complete the written survey that you may receive in the mail after your visit with us. Thank you!             Your Updated Medication List - Protect others around you: Learn how to safely use, store and throw away your medicines at www.disposemymeds.org.          This list is accurate as of 5/21/18  2:43 PM.  Always use your most recent med list.                   Brand Name Dispense Instructions for use Diagnosis    * REFRESH P.M. Oint      Apply to eye At Bedtime Both eyes.        * Artificial Tear Ointment Oint     2 Tube    Apply 1 Application to eye 7 times daily    Thyroid eye disease       BIOTIN PO      Take by mouth daily as needed        carboxymethylcellulose 0.5 % Soln ophthalmic solution    REFRESH PLUS     1 drop as needed        cycloSPORINE 0.05 % ophthalmic emulsion    RESTASIS    1 Box    Place 1 drop into both eyes 2 times daily    Dry eye syndrome, bilateral, Lagophthalmos, right       erythromycin ophthalmic ointment    ROMYCIN    3.5 g    Apply to skin incisions three times daily and into the eye at bedtime.    Postoperative eye state       IBUPROFEN PO      Take 200 mg by mouth every 6 hours as needed for  moderate pain        LORAZEPAM PO      Take 0.5 mg by mouth every 8 hours as needed        neomycin-polymyxin-dexamethasone 3.5-42688-3.1 Susp ophthalmic susp    MAXITROL    1 Bottle    Place 1 drop into both eyes 4 times daily    Postoperative eye state       ondansetron 4 MG ODT tab    ZOFRAN-ODT     Take by mouth every 8 hours as needed        prednisoLONE acetate 1 % ophthalmic susp    PRED FORTE    1 Bottle    Place 1 drop into both eyes 2 times daily    Dry eye syndrome, bilateral       PREDNISONE PO      Take 60 mg by mouth Taper for week.        PRILOSEC PO      Take 20 mg by mouth every morning        TIROSINT PO      Take 136 mcg by mouth daily        UNABLE TO FIND      Inject into the muscle every 3 months MEDICATION NAME: Depo Prevara every 3 months IM hip        * Notice:  This list has 2 medication(s) that are the same as other medications prescribed for you. Read the directions carefully, and ask your doctor or other care provider to review them with you.

## 2018-05-21 NOTE — LETTER
2018         RE:  :  MRN: Jefe Herrera  1980  [id number]     Dear Dr. Gaston:     Your patient, Jefe Herrera, returned for neuro-ophthalmic follow up. My assessment and plan are below.  For further details, please see my attached clinic note.      Assessment & Plan     Jefe Herrera is a 37 year old female with the following diagnoses:   1. Thyroid eye disease - Both Eyes       Follow up Thyroid eye disease last visit was 4 months ago.  She is status-post orbital decompression and canthopexy. She notes worsening proptosis and eyelid retraction.  Her endocrinologist is considering rituxan vs. Enrolling in a trial.  She has [place] medicaid which is no longer accepted by [facility]. Her thyroid stimulating immunoglobulin has been elevated significantly for many months.      Today, her visual acuity is 20/25 RIGHT eye and 20/20 LEFT eye.  Her color vision and pupils are normal. There is no caruncular edema.  The cornea shows mild changes.      Overall, it appears that the Thyroid eye disease is reactivating.  Recommend follow up 4-6 months sooner as needed for worsening symptoms. Recommend use artificial tear drops.      Follow up 4 months.  Based on the change in the insurance, will need to refer her to Norma Comer at [facility].      Again, thank you for allowing me to participate in the care of your patient.      Sincerely,    Natalio Oropeza MD  Professor, Neuro-Ophthalmology  Department of Ophthalmology and Visual Neurosciences  [facility]        CC: Travis Gooden  [facility]  [address] [facility] [place] [address]  VIA Facsimile: [phone]     Alecia Gaston NP  [facility]  [address] [place] [address]  VIA Facsimile: [phone]     Norma De La Garza MD  [facility]  [address] [facility] [address]  VIA Facsimile: [phone]       DX = Thyroid eye disease, reactivation

## 2018-10-01 ENCOUNTER — TRANSFERRED RECORDS (OUTPATIENT)
Dept: HEALTH INFORMATION MANAGEMENT | Facility: CLINIC | Age: 38
End: 2018-10-01

## 2019-08-19 ENCOUNTER — TRANSFERRED RECORDS (OUTPATIENT)
Dept: HEALTH INFORMATION MANAGEMENT | Facility: CLINIC | Age: 39
End: 2019-08-19

## 2020-03-02 ENCOUNTER — HEALTH MAINTENANCE LETTER (OUTPATIENT)
Age: 40
End: 2020-03-02

## 2020-06-16 ENCOUNTER — TRANSFERRED RECORDS (OUTPATIENT)
Dept: HEALTH INFORMATION MANAGEMENT | Facility: CLINIC | Age: 40
End: 2020-06-16

## 2020-07-24 ENCOUNTER — TRANSFERRED RECORDS (OUTPATIENT)
Dept: HEALTH INFORMATION MANAGEMENT | Facility: CLINIC | Age: 40
End: 2020-07-24

## 2020-07-28 ENCOUNTER — TRANSFERRED RECORDS (OUTPATIENT)
Dept: HEALTH INFORMATION MANAGEMENT | Facility: CLINIC | Age: 40
End: 2020-07-28

## 2020-08-11 ENCOUNTER — TRANSFERRED RECORDS (OUTPATIENT)
Dept: HEALTH INFORMATION MANAGEMENT | Facility: CLINIC | Age: 40
End: 2020-08-11

## 2020-12-20 ENCOUNTER — HEALTH MAINTENANCE LETTER (OUTPATIENT)
Age: 40
End: 2020-12-20

## 2021-04-18 ENCOUNTER — HEALTH MAINTENANCE LETTER (OUTPATIENT)
Age: 41
End: 2021-04-18

## 2021-05-25 ENCOUNTER — RECORDS - HEALTHEAST (OUTPATIENT)
Dept: ADMINISTRATIVE | Facility: CLINIC | Age: 41
End: 2021-05-25

## 2021-05-26 ENCOUNTER — RECORDS - HEALTHEAST (OUTPATIENT)
Dept: ADMINISTRATIVE | Facility: CLINIC | Age: 41
End: 2021-05-26

## 2021-05-27 ENCOUNTER — RECORDS - HEALTHEAST (OUTPATIENT)
Dept: ADMINISTRATIVE | Facility: CLINIC | Age: 41
End: 2021-05-27

## 2021-05-28 ENCOUNTER — RECORDS - HEALTHEAST (OUTPATIENT)
Dept: ADMINISTRATIVE | Facility: CLINIC | Age: 41
End: 2021-05-28

## 2021-10-03 ENCOUNTER — HEALTH MAINTENANCE LETTER (OUTPATIENT)
Age: 41
End: 2021-10-03

## 2022-05-15 ENCOUNTER — HEALTH MAINTENANCE LETTER (OUTPATIENT)
Age: 42
End: 2022-05-15

## 2022-09-10 ENCOUNTER — HEALTH MAINTENANCE LETTER (OUTPATIENT)
Age: 42
End: 2022-09-10

## 2023-06-03 ENCOUNTER — HEALTH MAINTENANCE LETTER (OUTPATIENT)
Age: 43
End: 2023-06-03

## 2023-10-01 ENCOUNTER — HEALTH MAINTENANCE LETTER (OUTPATIENT)
Age: 43
End: 2023-10-01

## (undated) DEVICE — SU VICRYL 6-0 S-14DA 18" UND J670G

## (undated) DEVICE — GLOVE PROTEXIS MICRO 7.5  2D73PM75

## (undated) DEVICE — PACK MINOR EYE CUSTOM ASC

## (undated) DEVICE — ESU EYE HIGH TEMP 65410-183

## (undated) DEVICE — SYR 03ML LL W/O NDL 309657

## (undated) DEVICE — SU PDS II 5-0 P-3 18" Z493G

## (undated) DEVICE — EYE PREP BETADINE 5% SOLUTION 30ML 0065-0411-30

## (undated) DEVICE — SU PLAIN 6-0 G-1DA 18" 770G

## (undated) DEVICE — LINEN TOWEL PACK X5 5464

## (undated) DEVICE — PEN MARKING SKIN VISIMARK 1424SR

## (undated) DEVICE — BLADE KNIFE SURG 15 371115

## (undated) DEVICE — NDL 30GA 0.5" 305106

## (undated) DEVICE — SOL WATER IRRIG 500ML BOTTLE 2F7113

## (undated) RX ORDER — HYDROCODONE BITARTRATE AND IBUPROFEN 7.5; 2 MG/1; MG/1
TABLET, FILM COATED ORAL
Status: DISPENSED
Start: 2017-08-09

## (undated) RX ORDER — KETOROLAC TROMETHAMINE 30 MG/ML
INJECTION, SOLUTION INTRAMUSCULAR; INTRAVENOUS
Status: DISPENSED
Start: 2017-08-09

## (undated) RX ORDER — ACETAMINOPHEN 325 MG/1
TABLET ORAL
Status: DISPENSED
Start: 2017-08-09

## (undated) RX ORDER — ONDANSETRON 2 MG/ML
INJECTION INTRAMUSCULAR; INTRAVENOUS
Status: DISPENSED
Start: 2017-08-09